# Patient Record
Sex: MALE | Race: WHITE | Employment: FULL TIME | URBAN - METROPOLITAN AREA
[De-identification: names, ages, dates, MRNs, and addresses within clinical notes are randomized per-mention and may not be internally consistent; named-entity substitution may affect disease eponyms.]

---

## 2020-02-03 ENCOUNTER — OFFICE VISIT (OUTPATIENT)
Dept: PRIMARY CARE CLINIC | Age: 40
End: 2020-02-03
Payer: COMMERCIAL

## 2020-02-03 VITALS
RESPIRATION RATE: 14 BRPM | SYSTOLIC BLOOD PRESSURE: 140 MMHG | BODY MASS INDEX: 26.57 KG/M2 | HEIGHT: 72 IN | TEMPERATURE: 97.6 F | HEART RATE: 98 BPM | WEIGHT: 196.2 LBS | DIASTOLIC BLOOD PRESSURE: 78 MMHG

## 2020-02-03 PROCEDURE — 99214 OFFICE O/P EST MOD 30 MIN: CPT | Performed by: NURSE PRACTITIONER

## 2020-02-03 RX ORDER — PREDNISONE 20 MG/1
40 TABLET ORAL DAILY
Qty: 10 TABLET | Refills: 0 | Status: SHIPPED | OUTPATIENT
Start: 2020-02-03 | End: 2020-02-08

## 2020-02-03 RX ORDER — DOXYCYCLINE HYCLATE 100 MG/1
100 CAPSULE ORAL 2 TIMES DAILY
Qty: 20 CAPSULE | Refills: 0 | Status: SHIPPED | OUTPATIENT
Start: 2020-02-03 | End: 2020-02-13

## 2020-02-03 ASSESSMENT — ENCOUNTER SYMPTOMS
ABDOMINAL PAIN: 0
VOMITING: 0
RHINORRHEA: 1
SINUS PRESSURE: 1
TROUBLE SWALLOWING: 0
SINUS PAIN: 1
NAUSEA: 0
SWOLLEN GLANDS: 0
WHEEZING: 0
SHORTNESS OF BREATH: 0
DIARRHEA: 0
SORE THROAT: 1
COUGH: 1

## 2020-02-03 NOTE — PATIENT INSTRUCTIONS
label. These medicines may not be safe for young children or for people with certain health problems. · Be careful when taking over-the-counter cold or flu medicines and Tylenol at the same time. Many of these medicines have acetaminophen, which is Tylenol. Read the labels to make sure that you are not taking more than the recommended dose. Too much acetaminophen (Tylenol) can be harmful. · Get plenty of rest.  · Do not smoke or allow others to smoke around you. If you need help quitting, talk to your doctor about stop-smoking programs and medicines. These can increase your chances of quitting for good. When should you call for help? Call 911 anytime you think you may need emergency care. For example, call if:    · You have severe trouble breathing.    Call your doctor now or seek immediate medical care if:    · You seem to be getting much sicker.     · You have new or worse trouble breathing.     · You have a new or higher fever.     · You have a new rash.    Watch closely for changes in your health, and be sure to contact your doctor if:    · You have a new symptom, such as a sore throat, an earache, or sinus pain.     · You cough more deeply or more often, especially if you notice more mucus or a change in the color of your mucus.     · You do not get better as expected. Where can you learn more? Go to https://Keemotion.[employer]. org and sign in to your [employer] account. Enter G632 in the [facility] box to learn more about \"Upper Respiratory Infection (Cold): Care Instructions. \"     If you do not have an account, please click on the \"Sign Up Now\" link. Current as of: June 9, 2019  Content Version: 12.3  © 5201-9709 Healthwise, [employer]. Care instructions adapted under license by [facility] ([facility]).  If you have questions about a medical condition or this instruction, always ask your healthcare professional. Giovany López any warranty or liability for your use of

## 2020-02-03 NOTE — LETTER
[place] [address] [address] [facility]  Phone: [phone]  Fax: [phone] [facility], APRN - CNP        February 3, 2020     Patient: Smitha Eye   YOB: 1980   Date of Visit: 2/3/2020       To Whom It May Concern: It is my medical opinion that Kate Jason should remain out of work until 02/05/2020. If you have any questions or concerns, please don't hesitate to call.     Sincerely,        Libertad Jean, APRN - CNP

## 2020-02-03 NOTE — PROGRESS NOTES
[facility]  [facility] [place] [address] [address] [facility]  Dept: [phone]  Dept Fax: [phone]    Santi Dunham is a 35 y.o. male who [facility] today for his medical conditions/complaints as noted below. Santi Dunham is c/o of URI (X 2 days with right ear pain. )      HPI:     URI    This is a new problem. The current episode started in the past 7 days. The problem has been gradually worsening. Maximum temperature: SUBJECTIVE. Associated symptoms include congestion, coughing, ear pain, headaches, a plugged ear sensation, rhinorrhea, sinus pain, sneezing and a sore throat. Pertinent negatives include no abdominal pain, chest pain, diarrhea, dysuria, joint pain, joint swelling, nausea, neck pain, rash, swollen glands, vomiting or wheezing. He has tried nothing for the symptoms. The treatment provided no relief. Ear Fullness    There is pain in the right ear. This is a new problem. The current episode started in the past 7 days. The problem occurs constantly. The problem has been gradually worsening. Maximum temperature: SUBJECTIVE. The fever has been present for 1 to 2 days. The pain is at a severity of 6/10. The pain is moderate. Associated symptoms include coughing, headaches, hearing loss (MUFFLED), rhinorrhea and a sore throat. Pertinent negatives include no abdominal pain, diarrhea, ear discharge, neck pain, rash or vomiting. He has tried nothing for the symptoms. The treatment provided no relief. There is no history of a chronic ear infection, hearing loss or a tympanostomy tube. No past medical history on file.      Current Outpatient Medications   Medication Sig Dispense Refill    doxycycline hyclate (VIBRAMYCIN) 100 MG capsule Take 1 capsule by mouth 2 times daily for 10 days 20 capsule 0    predniSONE (DELTASONE) 20 MG tablet Take 2 tablets by mouth daily for 5 days 10 tablet 0     No current facility-administered medications for this visit. Allergies   Allergen Reactions    Penicillins        Subjective:      Review of Systems   Constitutional: Positive for fatigue and fever. Negative for chills. HENT: Positive for congestion, ear pain, hearing loss (MUFFLED), postnasal drip, rhinorrhea, sinus pressure, sinus pain, sneezing and sore throat. Negative for ear discharge and trouble swallowing. Respiratory: Positive for cough. Negative for shortness of breath and wheezing. Cardiovascular: Negative for chest pain. Gastrointestinal: Negative for abdominal pain, diarrhea, nausea and vomiting. Genitourinary: Negative for decreased urine volume, difficulty urinating and dysuria. Musculoskeletal: Negative for joint pain, neck pain and neck stiffness. Skin: Negative for rash. Neurological: Positive for headaches. Negative for dizziness, syncope and light-headedness. Objective:     Physical Exam  Vitals signs and nursing note reviewed. Constitutional:       General: He is not in acute distress. Appearance: He is well-developed. He is ill-appearing. HENT:      Right Ear: Ear canal normal. A middle ear effusion is present. Tympanic membrane is erythematous and bulging. Left Ear: Tympanic membrane and ear canal normal.  No middle ear effusion. Nose: Mucosal edema and rhinorrhea present. Right Sinus: Maxillary sinus tenderness and frontal sinus tenderness present. Left Sinus: Maxillary sinus tenderness and frontal sinus tenderness present. Mouth/Throat:      Mouth: Mucous membranes are not dry. Pharynx: Posterior oropharyngeal erythema present. Eyes:      Conjunctiva/sclera: Conjunctivae normal.   Neck:      Musculoskeletal: Normal range of motion and neck supple. Cardiovascular:      Rate and Rhythm: Normal rate and regular rhythm. Heart sounds: Normal heart sounds. Pulmonary:      Effort: Pulmonary effort is normal.      Breath sounds: Normal breath sounds. No wheezing. Lymphadenopathy:      Cervical: No cervical adenopathy. Skin:     General: Skin is warm and dry. Findings: No rash. Neurological:      Mental Status: He is alert. BP (!) 140/78   Pulse 98   Temp 97.6 °F (36.4 °C) (Temporal)   Resp 14   Ht 6' (1.829 m)   Wt 196 lb 3.2 oz (89 kg)   BMI 26.61 kg/m²     Assessment:      Diagnosis Orders   1. Acute non-recurrent pansinusitis  doxycycline hyclate (VIBRAMYCIN) 100 MG capsule    predniSONE (DELTASONE) 20 MG tablet   2. Right otitis media with effusion  doxycycline hyclate (VIBRAMYCIN) 100 MG capsule    predniSONE (DELTASONE) 20 MG tablet       Plan:             Discussed exam, POCT findings, plan of care (including prescriptive and supportive as listed below) and follow-up atlength with  Patient. Reviewed all prescribed and recommended medications, administration and side effects. Encouraged to return to [address] [place] [address] for noimprovement and or worsening of symptoms. To ER or call 911 if any difficulty breathing, shortness of breath, inability to swallow, hives or temp greater than 103 degrees. Questions answered. They verbalized understandingand agreement. Return if symptoms worsen or fail to improve. Orders Placed This Encounter   Medications    doxycycline hyclate (VIBRAMYCIN) 100 MG capsule     Sig: Take 1 capsule by mouth 2 times daily for 10 days     Dispense:  20 capsule     Refill:  0    predniSONE (DELTASONE) 20 MG tablet     Sig: Take 2 tablets by mouth daily for 5 days     Dispense:  10 tablet     Refill:  0          All patient questions answered. Pt voiced understanding.       Electronically signed by [address] [place] [address], APRN - CNP on 2/3/2020 at 9:30 AM

## 2020-02-20 ENCOUNTER — OFFICE VISIT (OUTPATIENT)
Dept: PRIMARY CARE CLINIC | Age: 40
End: 2020-02-20
Payer: COMMERCIAL

## 2020-02-20 VITALS
DIASTOLIC BLOOD PRESSURE: 86 MMHG | WEIGHT: 196.2 LBS | SYSTOLIC BLOOD PRESSURE: 124 MMHG | BODY MASS INDEX: 26.57 KG/M2 | HEIGHT: 72 IN | TEMPERATURE: 99.3 F | RESPIRATION RATE: 16 BRPM | HEART RATE: 82 BPM

## 2020-02-20 PROCEDURE — 99213 OFFICE O/P EST LOW 20 MIN: CPT | Performed by: NURSE PRACTITIONER

## 2020-02-20 RX ORDER — LORATADINE AND PSEUDOEPHEDRINE 10; 240 MG/1; MG/1
1 TABLET, EXTENDED RELEASE ORAL DAILY
Qty: 14 TABLET | Refills: 0 | Status: SHIPPED | OUTPATIENT
Start: 2020-02-20 | End: 2022-03-17

## 2020-02-20 ASSESSMENT — ENCOUNTER SYMPTOMS
RHINORRHEA: 0
WHEEZING: 0
PHOTOPHOBIA: 0
EYE REDNESS: 0
SHORTNESS OF BREATH: 0
SORE THROAT: 0
COUGH: 0
ABDOMINAL PAIN: 0
FACIAL SWELLING: 0
VOMITING: 0
NAUSEA: 0
SINUS PAIN: 0

## 2020-02-20 NOTE — PATIENT INSTRUCTIONS
wax softener, use an ear syringe to gently flush the ear. Make sure the flushing solution is body temperature. Cool or hot fluids in the ear can cause dizziness. When should you call for help? Call your doctor now or seek immediate medical care if:    · Pus or blood drains from your ear.     · Your ears are ringing or feel full.     · You have a loss of hearing.    Watch closely for changes in your health, and be sure to contact your doctor if:    · You have pain or reduced hearing after 1 week of home treatment.     · You have any new symptoms, such as nausea or balance problems. Where can you learn more? Go to https://Voltari.GetGifted. org and sign in to your [employer] account. Enter N371 in the Ask.com box to learn more about \"Earwax Blockage: Care Instructions. \"     If you do not have an account, please click on the \"Sign Up Now\" link. Current as of: June 26, 2019  Content Version: 12.3  © 9431-2142 Healthwise, Incorporated. Care instructions adapted under license by [facility] ([facility]). If you have questions about a medical condition or this instruction, always ask your healthcare professional. [place] [address] any warranty or liability for your use of this information.

## 2020-02-20 NOTE — PROGRESS NOTES
Musculoskeletal: Negative for myalgias. Skin: Negative for rash. Objective:     Physical Exam  Vitals signs and nursing note reviewed. Constitutional:       General: He is not in acute distress. Appearance: Normal appearance. He is not ill-appearing, toxic-appearing or diaphoretic. HENT:      Right Ear: A middle ear effusion is present. There is no impacted cerumen. Tympanic membrane is not erythematous or bulging. Left Ear:  No middle ear effusion. There is no impacted cerumen. Tympanic membrane is not erythematous or bulging. Nose: Mucosal edema and congestion present. No rhinorrhea. Right Sinus: No maxillary sinus tenderness or frontal sinus tenderness. Left Sinus: No maxillary sinus tenderness or frontal sinus tenderness. Mouth/Throat:      Mouth: Mucous membranes are moist.      Pharynx: No oropharyngeal exudate or posterior oropharyngeal erythema. Eyes:      General:         Right eye: No discharge. Left eye: No discharge. Conjunctiva/sclera: Conjunctivae normal.   Neck:      Musculoskeletal: Normal range of motion and neck supple. Cardiovascular:      Rate and Rhythm: Normal rate and regular rhythm. Heart sounds: No murmur. Pulmonary:      Effort: Pulmonary effort is normal. No respiratory distress. Breath sounds: Normal breath sounds. No wheezing, rhonchi or rales. Neurological:      Mental Status: He is alert. /86 (Site: Right Upper Arm, Position: Sitting, Cuff Size: Large Adult)   Pulse 82   Temp 99.3 °F (37.4 °C) (Temporal)   Resp 16   Ht 6' (1.829 m)   Wt 196 lb 3.2 oz (89 kg)   BMI 26.61 kg/m²     Assessment:      Diagnosis Orders   1. Middle ear effusion, right  loratadine-pseudoephedrine (CLARITIN-D 24 HOUR)  MG per extended release tablet     Patient presents with right ear fullness following treatment of a acute sinusitis. On physical exam he does have a middle effusion behind his right ear.   We will

## 2021-07-09 ENCOUNTER — TELEPHONE (OUTPATIENT)
Dept: PRIMARY CARE CLINIC | Age: 41
End: 2021-07-09

## 2021-08-09 ENCOUNTER — OFFICE VISIT (OUTPATIENT)
Dept: PRIMARY CARE CLINIC | Age: 41
End: 2021-08-09
Payer: COMMERCIAL

## 2021-08-09 VITALS
OXYGEN SATURATION: 99 % | RESPIRATION RATE: 20 BRPM | DIASTOLIC BLOOD PRESSURE: 64 MMHG | HEART RATE: 88 BPM | TEMPERATURE: 97.8 F | SYSTOLIC BLOOD PRESSURE: 118 MMHG | BODY MASS INDEX: 26.06 KG/M2 | HEIGHT: 72 IN | WEIGHT: 192.4 LBS

## 2021-08-09 DIAGNOSIS — Z00.00 ENCOUNTER FOR MEDICAL EXAMINATION TO ESTABLISH CARE: ICD-10-CM

## 2021-08-09 DIAGNOSIS — Z13.220 LIPID SCREENING: ICD-10-CM

## 2021-08-09 DIAGNOSIS — F43.9 STRESS: Primary | ICD-10-CM

## 2021-08-09 DIAGNOSIS — Z13.1 DIABETES MELLITUS SCREENING: ICD-10-CM

## 2021-08-09 PROCEDURE — 99203 OFFICE O/P NEW LOW 30 MIN: CPT | Performed by: NURSE PRACTITIONER

## 2021-08-09 RX ORDER — ESCITALOPRAM OXALATE 10 MG/1
10 TABLET ORAL DAILY
Qty: 90 TABLET | Refills: 1 | Status: SHIPPED | OUTPATIENT
Start: 2021-08-09 | End: 2022-01-31 | Stop reason: SDUPTHER

## 2021-08-09 SDOH — ECONOMIC STABILITY: FOOD INSECURITY: WITHIN THE PAST 12 MONTHS, YOU WORRIED THAT YOUR FOOD WOULD RUN OUT BEFORE YOU GOT MONEY TO BUY MORE.: PATIENT DECLINED

## 2021-08-09 SDOH — ECONOMIC STABILITY: FOOD INSECURITY: WITHIN THE PAST 12 MONTHS, THE FOOD YOU BOUGHT JUST DIDN'T LAST AND YOU DIDN'T HAVE MONEY TO GET MORE.: PATIENT DECLINED

## 2021-08-09 ASSESSMENT — PATIENT HEALTH QUESTIONNAIRE - PHQ9
SUM OF ALL RESPONSES TO PHQ QUESTIONS 1-9: 1
SUM OF ALL RESPONSES TO PHQ QUESTIONS 1-9: 1
SUM OF ALL RESPONSES TO PHQ9 QUESTIONS 1 & 2: 1
SUM OF ALL RESPONSES TO PHQ QUESTIONS 1-9: 1
2. FEELING DOWN, DEPRESSED OR HOPELESS: 1
1. LITTLE INTEREST OR PLEASURE IN DOING THINGS: 0

## 2021-08-09 ASSESSMENT — ENCOUNTER SYMPTOMS
COUGH: 0
WHEEZING: 0
DIARRHEA: 0
RHINORRHEA: 0
CONSTIPATION: 0
NAUSEA: 0
VISUAL CHANGE: 0
VOMITING: 0
SORE THROAT: 0
SHORTNESS OF BREATH: 0
ABDOMINAL PAIN: 0

## 2021-08-09 ASSESSMENT — SOCIAL DETERMINANTS OF HEALTH (SDOH): HOW HARD IS IT FOR YOU TO PAY FOR THE VERY BASICS LIKE FOOD, HOUSING, MEDICAL CARE, AND HEATING?: PATIENT DECLINED

## 2021-08-09 NOTE — PATIENT INSTRUCTIONS
SURVEY:    You may be receiving a survey from [employer] regarding your visit today. Please complete the survey to enable us to provide the highest quality of care to you and your family. If you cannot score us a very good on any question, please call the office to discuss how we could have made your experience a very good one. Thank you.   Rosa Jean, APRN-CNP  Chris Singh, APRN-CNP  Lorelei Karma, LPN [place] [facility], LPN [place], [place] Emeterio Hilda, [place] Pam, PCA

## 2021-08-09 NOTE — PROGRESS NOTES
Name: Divya Garcia  : 1980         Chief Complaint:     Chief Complaint   Patient presents with   [address]     Previous PCP Dr. Felton Sage, has not been seen for 5 years. Concerned with sleep issues. History of Present Illness:      Divya Garcia is a 38 y.o.  male who presents with Establish Care (Previous PCP Dr. Felton Sage, has not been seen for 5 years. Concerned with sleep issues.)      Cathy Antony is here today to establish care. Overall he is feeling relatively well and has no medical issues or concerns at this time. He does complain of stress and inability to sleep. Patient states he wakes at least 4-5 times per night and not to use the restroom. He states his job is stressful and he does worry about his job while trying to sleep. He has taken melatonin and Tylenol in the past with limited success. See below for further comment. Mental Health Problem  The primary symptoms include dysphoric mood. The primary symptoms do not include delusions, hallucinations, bizarre behavior, disorganized speech, negative symptoms or somatic symptoms. Primary symptoms comment: NERVOUSNESS. The current episode started more than 1 month ago. This is a chronic problem. The onset of the illness is precipitated by emotional stress. The degree of incapacity that he is experiencing as a consequence of his illness is moderate. Sequelae of the illness include harmed interpersonal relations and an inability to work. Additional symptoms of the illness include insomnia, agitation, attention impairment and distractible. Additional symptoms of the illness do not include anhedonia, hypersomnia, appetite change, unexpected weight change, fatigue, psychomotor retardation, feelings of worthlessness, euphoric mood, increased goal-directed activity, flight of ideas, inflated self-esteem, decreased need for sleep, poor judgment, visual change, headaches, abdominal pain or seizures. He does not admit to suicidal ideas.  He does not have a plan to attempt suicide. He does not contemplate harming himself. He has not already injured self. He does not contemplate injuring another person. He has not already  injured another person. Risk factors that are present for mental illness include a family history of mental illness. Past Medical History:     History reviewed. No pertinent past medical history. Reviewed all health maintenance requirements and ordered appropriate tests  Health Maintenance Due   Topic Date Due    Lipid screen  Never done    Diabetes screen  Never done       Past Surgical History:     History reviewed. No pertinent surgical history. Medications:       Prior to Admission medications    Medication Sig Start Date End Date Taking? Authorizing Provider   escitalopram (LEXAPRO) 10 MG tablet Take 1 tablet by mouth daily 8/9/21  Yes MARQUITA Parker CNP   loratadine-pseudoephedrine (CLARITIN-D 24 HOUR)  MG per extended release tablet Take 1 tablet by mouth daily  Patient not taking: Reported on 8/9/2021 2/20/20   MARQUITA Valle CNP        Allergies:       Penicillins    Social History:     Tobacco:    reports that he has quit smoking. He has never used smokeless tobacco.  Alcohol:      reports current alcohol use. Drug Use:  reports no history of drug use. Family History:     History reviewed. No pertinent family history. Review of Systems:     Positive and Negative as described in HPI    Review of Systems   Constitutional: Negative for appetite change, chills, fatigue, fever and unexpected weight change. HENT: Negative for congestion, rhinorrhea and sore throat. Eyes: Negative for visual disturbance. Respiratory: Negative for cough, shortness of breath and wheezing. Cardiovascular: Negative for chest pain and palpitations. Gastrointestinal: Negative for abdominal pain, constipation, diarrhea, nausea and vomiting. Genitourinary: Negative for difficulty urinating and dysuria. Thought content normal. Thought content does not include homicidal or suicidal ideation. Thought content does not include homicidal or suicidal plan. Cognition and Memory: Cognition normal.         Judgment: Judgment normal.         Data:     No results found for: NA, K, CL, CO2, BUN, CREATININE, GLUCOSE, PROT, LABALBU, BILITOT, ALKPHOS, AST, ALT  No results found for: WBC, RBC, HGB, HCT, MCV, MCH, MCHC, RDW, PLT, MPV  No results found for: TSH  No results found for: CHOL, HDL, PSA, LABA1C    Assessment/Plan:      Diagnosis Orders   1. Stress  escitalopram (LEXAPRO) 10 MG tablet   2. Lipid screening  Lipid Panel   3. Diabetes mellitus screening  Glucose, Fasting   4. Encounter for medical examination to establish care       We will start esitalopram 10 mg daily. I have made him aware that it may take 1 to 2 weeks for him to notice any difference if at all. He is agreeable. We will see him back in 3 to 4 weeks, sooner if any problems. Labs as ordered. 1.  Nikhil received counseling on the following healthy behaviors: nutrition, exercise and medication adherence  2. Patient given educational materials - see patient instructions  3. Was a self-tracking handout given in paper form or via [employer]? No  If yes, see orders or list here. 4.  Discussed use, benefit, and side effects of prescribed medications. Barriers to medication compliance addressed. All patient questions answered. Pt voiced understanding. 5.  Reviewed prior labs and health maintenance  6. Continue current medications, diet and exercise. Completed Refills   Requested Prescriptions     Signed Prescriptions Disp Refills    escitalopram (LEXAPRO) 10 MG tablet 90 tablet 1     Sig: Take 1 tablet by mouth daily         Return in about 4 weeks (around 9/6/2021) for Recheck.

## 2021-08-13 LAB
CHOLESTEROL/HDL RATIO: 3.6 (ref 1–5)
CHOLESTEROL: 202 MG/DL (ref 150–200)
GLUCOSE: 111 MG/DL (ref 65–99)
HDLC SERPL-MCNC: 56 MG/DL
LDL CHOLESTEROL CALCULATED: 114 MG/DL
LDL/HDL RATIO: 2
TRIGL SERPL-MCNC: 158 MG/DL (ref 27–150)
VLDLC SERPL CALC-MCNC: 32 MG/DL (ref 0–30)

## 2021-08-16 ENCOUNTER — TELEPHONE (OUTPATIENT)
Dept: PRIMARY CARE CLINIC | Age: 41
End: 2021-08-16

## 2021-08-16 NOTE — TELEPHONE ENCOUNTER
Contacted patient in regards to lab results. Patient scheduled 8/17 @ 8:30 for A1c check. Patient verbalized understanding.

## 2021-08-16 NOTE — TELEPHONE ENCOUNTER
----- Message from [facility], APRN - CNP sent at 8/14/2021  9:46 AM EDT -----  Lipids and glucose a little elevated. Have him come in for A1c. Thank you.

## 2021-08-17 ENCOUNTER — NURSE ONLY (OUTPATIENT)
Dept: PRIMARY CARE CLINIC | Age: 41
End: 2021-08-17
Payer: COMMERCIAL

## 2021-08-17 VITALS
RESPIRATION RATE: 20 BRPM | DIASTOLIC BLOOD PRESSURE: 70 MMHG | BODY MASS INDEX: 26.31 KG/M2 | SYSTOLIC BLOOD PRESSURE: 134 MMHG | WEIGHT: 194 LBS | HEART RATE: 74 BPM | OXYGEN SATURATION: 98 % | TEMPERATURE: 97.5 F

## 2021-08-17 DIAGNOSIS — R73.9 ELEVATED BLOOD SUGAR: Primary | ICD-10-CM

## 2021-08-17 LAB — HBA1C MFR BLD: 5.8 %

## 2021-08-17 PROCEDURE — 83036 HEMOGLOBIN GLYCOSYLATED A1C: CPT | Performed by: NURSE PRACTITIONER

## 2021-09-15 ENCOUNTER — OFFICE VISIT (OUTPATIENT)
Dept: PRIMARY CARE CLINIC | Age: 41
End: 2021-09-15
Payer: COMMERCIAL

## 2021-09-15 VITALS
SYSTOLIC BLOOD PRESSURE: 122 MMHG | DIASTOLIC BLOOD PRESSURE: 82 MMHG | WEIGHT: 187.3 LBS | OXYGEN SATURATION: 98 % | TEMPERATURE: 97.8 F | RESPIRATION RATE: 20 BRPM | BODY MASS INDEX: 25.4 KG/M2 | HEART RATE: 72 BPM

## 2021-09-15 DIAGNOSIS — R73.9 ELEVATED BLOOD SUGAR: ICD-10-CM

## 2021-09-15 DIAGNOSIS — F43.9 STRESS: Primary | ICD-10-CM

## 2021-09-15 PROCEDURE — 99214 OFFICE O/P EST MOD 30 MIN: CPT | Performed by: NURSE PRACTITIONER

## 2021-09-15 ASSESSMENT — ENCOUNTER SYMPTOMS
WHEEZING: 0
VISUAL CHANGE: 0
CONSTIPATION: 0
SORE THROAT: 0
RHINORRHEA: 0
ABDOMINAL PAIN: 0
COUGH: 0
NAUSEA: 0
VOMITING: 0
SHORTNESS OF BREATH: 0
DIARRHEA: 0

## 2021-09-15 NOTE — PROGRESS NOTES
Name: Thong Douglas  : 1980         Chief Complaint:     Chief Complaint   Patient presents with    Stress     feeling much better       History of Present Illness:      Thong Douglas is a 38 y.o.  male who presents with Stress (feeling much better)      HPI      Past Medical History:     History reviewed. No pertinent past medical history. Reviewed all health maintenance requirements and ordered appropriate tests  There are no preventive care reminders to display for this patient. Past Surgical History:     History reviewed. No pertinent surgical history. Medications:       Prior to Admission medications    Medication Sig Start Date End Date Taking? Authorizing Provider   escitalopram (LEXAPRO) 10 MG tablet Take 1 tablet by mouth daily 21  Yes MARQUITA Obando CNP   loratadine-pseudoephedrine (CLARITIN-D 24 HOUR)  MG per extended release tablet Take 1 tablet by mouth daily  Patient not taking: Reported on 2021   MARQUITA Quiles CNP        Allergies:       Penicillins    Social History:     Tobacco:    reports that he has quit smoking. He has never used smokeless tobacco.  Alcohol:      reports current alcohol use. Drug Use:  reports no history of drug use. Family History:     History reviewed. No pertinent family history. Review of Systems:     Positive and Negative as described in HPI    Review of Systems    Physical Exam:   Vitals:  /82 (Position: Sitting)   Pulse 72   Temp 97.8 °F (36.6 °C) (Temporal)   Resp 20   Wt 187 lb 4.8 oz (85 kg)   SpO2 98%   BMI 25.40 kg/m²     Physical Exam    Data:     Lab Results   Component Value Date    GLUCOSE 111 2021     No results found for: WBC, RBC, HGB, HCT, MCV, MCH, MCHC, RDW, PLT, MPV  No results found for: TSH  Lab Results   Component Value Date    CHOL 202 2021    HDL 56 2021    LABA1C 5.8 2021       Assessment/Plan:      Diagnosis Orders   1. Stress         1.   Gennaro Newell received counseling on the following healthy behaviors: {[facility]:20719}  2. Patient given educational materials - see patient instructions  3. Was a self-tracking handout given in paper form or via [employer]? {YES / [facility]:16164}  If yes, see orders or list here. 4.  Discussed use, benefit, and side effects of prescribed medications. Barriers to medication compliance addressed. All patient questions answered. Pt voiced understanding. 5.  Reviewed prior labs and health maintenance  6. Continue current medications, diet and exercise. Completed Refills   Requested Prescriptions      No prescriptions requested or ordered in this encounter         Return in about 6 months (around 3/15/2022) for Check up.

## 2021-09-15 NOTE — PATIENT INSTRUCTIONS
SURVEY:    You may be receiving a survey from [employer] regarding your visit today. Please complete the survey to enable us to provide the highest quality of care to you and your family. If you cannot score us a very good on any question, please call the office to discuss how we could have made your experience a very good one. Thank you.   Sabas Jean, APRN-TANA Malhotra, TANA Cardenas, MIKE Naik, MIKE Ar[place], [place]  Katt, PCA

## 2021-09-15 NOTE — PROGRESS NOTES
history of mental illness. Past Medical History:     History reviewed. No pertinent past medical history. Reviewed all health maintenance requirements and ordered appropriate tests  There are no preventive care reminders to display for this patient. Past Surgical History:     History reviewed. No pertinent surgical history. Medications:       Prior to Admission medications    Medication Sig Start Date End Date Taking? Authorizing Provider   escitalopram (LEXAPRO) 10 MG tablet Take 1 tablet by mouth daily 8/9/21  Yes MARQUITA Olivera CNP   loratadine-pseudoephedrine (CLARITIN-D 24 HOUR)  MG per extended release tablet Take 1 tablet by mouth daily  Patient not taking: Reported on 8/9/2021 2/20/20   [place] MARQUITA Mckay CNP        Allergies:       Penicillins    Social History:     Tobacco:    reports that he has quit smoking. He has never used smokeless tobacco.  Alcohol:      reports current alcohol use. Drug Use:  reports no history of drug use. Family History:     History reviewed. No pertinent family history. Review of Systems:     Positive and Negative as described in HPI    Review of Systems   Constitutional: Negative for appetite change, chills, fatigue, fever and unexpected weight change. HENT: Negative for congestion, rhinorrhea and sore throat. Eyes: Negative for visual disturbance. Respiratory: Negative for cough, shortness of breath and wheezing. Cardiovascular: Negative for chest pain and palpitations. Gastrointestinal: Negative for abdominal pain, constipation, diarrhea, nausea and vomiting. Genitourinary: Negative for difficulty urinating and dysuria. Musculoskeletal: Negative for gait problem, neck pain and neck stiffness. Skin: Negative for rash. Neurological: Negative for dizziness, seizures, syncope, light-headedness and headaches. Psychiatric/Behavioral: Positive for agitation (improved) and sleep disturbance (improved).  Negative for behavioral problems, confusion, decreased concentration, dysphoric mood, hallucinations, self-injury and suicidal ideas. The patient is nervous/anxious (improved) and has insomnia. The patient is not hyperactive. Physical Exam:   Vitals:  /82 (Position: Sitting)   Pulse 72   Temp 97.8 °F (36.6 °C) (Temporal)   Resp 20   Wt 187 lb 4.8 oz (85 kg)   SpO2 98%   BMI 25.40 kg/m²     Physical Exam  Vitals and nursing note reviewed. Constitutional:       General: He is not in acute distress. Appearance: Normal appearance. He is not ill-appearing. HENT:      Mouth/Throat:      Mouth: Mucous membranes are moist.   Eyes:      General: No scleral icterus. Conjunctiva/sclera: Conjunctivae normal.   Neck:      Vascular: No carotid bruit. Cardiovascular:      Rate and Rhythm: Normal rate and regular rhythm. Heart sounds: No murmur heard. Pulmonary:      Effort: Pulmonary effort is normal.      Breath sounds: Normal breath sounds. No wheezing. Abdominal:      General: Bowel sounds are normal. There is no distension. Palpations: Abdomen is soft. Tenderness: There is no abdominal tenderness. Musculoskeletal:         General: Normal range of motion. Cervical back: Normal range of motion and neck supple. Skin:     General: Skin is warm and dry. Findings: No rash. Neurological:      Mental Status: He is alert and oriented to person, place, and time. Psychiatric:         Attention and Perception: Attention normal.         Mood and Affect: Mood and affect normal. Mood is not anxious or depressed. Speech: Speech normal.         Behavior: Behavior normal. Behavior is cooperative. Thought Content: Thought content normal. Thought content does not include homicidal or suicidal ideation. Thought content does not include homicidal or suicidal plan.          Cognition and Memory: Cognition normal.         Judgment: Judgment normal.         Data:     Lab Results Component Value Date    GLUCOSE 111 08/13/2021     No results found for: WBC, RBC, HGB, HCT, MCV, MCH, MCHC, RDW, PLT, MPV  No results found for: TSH  Lab Results   Component Value Date    CHOL 202 08/13/2021    HDL 56 08/13/2021    LABA1C 5.8 08/17/2021       Assessment/Plan:      Diagnosis Orders   1. Stress     2. Elevated blood sugar       Continue Lexapro at 10 mg daily. If he feels the need to increase to 20 mg may do that and call with progress. Continue healthy lifestyle. 1.  Nikhil received counseling on the following healthy behaviors: nutrition, exercise and medication adherence  2. Patient given educational materials - see patient instructions  3. Was a self-tracking handout given in paper form or via [employer]? No  If yes, see orders or list here. 4.  Discussed use, benefit, and side effects of prescribed medications. Barriers to medication compliance addressed. All patient questions answered. Pt voiced understanding. 5.  Reviewed prior labs and health maintenance  6. Continue current medications, diet and exercise. Completed Refills   Requested Prescriptions      No prescriptions requested or ordered in this encounter         Return in about 6 months (around 3/15/2022) for Check up.

## 2022-01-31 DIAGNOSIS — F43.9 STRESS: ICD-10-CM

## 2022-01-31 RX ORDER — ESCITALOPRAM OXALATE 10 MG/1
10 TABLET ORAL DAILY
Qty: 90 TABLET | Refills: 1 | Status: SHIPPED | OUTPATIENT
Start: 2022-01-31 | End: 2022-07-01 | Stop reason: SDUPTHER

## 2022-01-31 NOTE — TELEPHONE ENCOUNTER
Health Maintenance   Topic Date Due    DTaP/Tdap/Td vaccine (1 - Tdap) 08/09/2022 (Originally 11/1/1998)    COVID-19 Vaccine (2 - Booster for Patsy series) 08/09/2022 (Originally 6/22/2021)    Hepatitis C screen  08/09/2022 (Originally 1980)    HIV screen  08/09/2022 (Originally 11/1/1994)    Flu vaccine (1) 09/15/2022 (Originally 9/1/2021)    Depression Screen  08/09/2022    A1C test (Diabetic or Prediabetic)  08/17/2022    Lipid screen  08/13/2026    Hepatitis A vaccine  Aged Out    Hepatitis B vaccine  Aged Out    Hib vaccine  Aged Out    Meningococcal (ACWY) vaccine  Aged Out    Pneumococcal 0-64 years Vaccine  Aged Out             (applicable per patient's age: Cancer Screenings, Depression Screening, Fall Risk Screening, Immunizations)    Hemoglobin A1C (%)   Date Value   08/17/2021 5.8     LDL Calculated (mg/dL)   Date Value   08/13/2021 114      (goal A1C is < 7)   (goal LDL is <100) need 30-50% reduction from baseline     BP Readings from Last 3 Encounters:   09/15/21 122/82   08/17/21 134/70   08/09/21 118/64    (goal /80)      All Future Testing planned in [facility]:      Next Visit Date:  Future Appointments   Date Time Provider Norbert Pitt   3/17/2022  9:00 AM Farhat Jean, APRN - CNP [facility]            There is no problem list on file for this patient.

## 2022-03-17 ENCOUNTER — OFFICE VISIT (OUTPATIENT)
Dept: PRIMARY CARE CLINIC | Age: 42
End: 2022-03-17
Payer: COMMERCIAL

## 2022-03-17 VITALS
HEART RATE: 74 BPM | BODY MASS INDEX: 24.42 KG/M2 | HEIGHT: 72 IN | SYSTOLIC BLOOD PRESSURE: 118 MMHG | OXYGEN SATURATION: 100 % | WEIGHT: 180.3 LBS | DIASTOLIC BLOOD PRESSURE: 68 MMHG | TEMPERATURE: 97.8 F | RESPIRATION RATE: 20 BRPM

## 2022-03-17 DIAGNOSIS — R73.9 ELEVATED BLOOD SUGAR: ICD-10-CM

## 2022-03-17 DIAGNOSIS — F43.9 STRESS: Primary | ICD-10-CM

## 2022-03-17 PROCEDURE — 99214 OFFICE O/P EST MOD 30 MIN: CPT | Performed by: NURSE PRACTITIONER

## 2022-03-17 ASSESSMENT — ENCOUNTER SYMPTOMS
DIARRHEA: 0
VOMITING: 0
SORE THROAT: 0
NAUSEA: 0
RHINORRHEA: 0
SHORTNESS OF BREATH: 0
ABDOMINAL PAIN: 0
CONSTIPATION: 0
COUGH: 0
VISUAL CHANGE: 0
WHEEZING: 0

## 2022-03-17 ASSESSMENT — PATIENT HEALTH QUESTIONNAIRE - PHQ9
SUM OF ALL RESPONSES TO PHQ QUESTIONS 1-9: 0
2. FEELING DOWN, DEPRESSED OR HOPELESS: 0
SUM OF ALL RESPONSES TO PHQ9 QUESTIONS 1 & 2: 0
SUM OF ALL RESPONSES TO PHQ QUESTIONS 1-9: 0
1. LITTLE INTEREST OR PLEASURE IN DOING THINGS: 0

## 2022-03-17 NOTE — PROGRESS NOTES
Name: Cindy Kramer  : 1980         Chief Complaint:     Chief Complaint   Patient presents with   [address] Problem     routine check, no concerns       History of Present Illness:      Cindy Kramer is a 42 y.o.  male who presents with Mental Health Problem (routine check, no concerns)      Jade Link is here today for a routine office visit. Stress-stable, patient states he feels the citalopram is working very well. Continues at 10 mg dose and states that on some days he feels that he may need a little more but most days he feels pretty good. See below for further comment. Elevated blood sugar-patient has lost 7 pounds. He stopped drinking soda and most sugary drinks. He also decreased his carbohydrates  UPDATE 2022-continues with weight loss. Walking at least 10,000 steps per day. Feeling very well. Mental Health Problem  The primary symptoms do not include dysphoric mood, delusions, hallucinations, bizarre behavior, disorganized speech, negative symptoms or somatic symptoms. Primary symptoms comment: NERVOUSNESS. The current episode started more than 1 month ago. This is a chronic problem. The onset of the illness is precipitated by emotional stress. The degree of incapacity that he is experiencing as a consequence of his illness is moderate. Sequelae of the illness include harmed interpersonal relations and an inability to work. Additional symptoms of the illness include agitation (rare). Additional symptoms of the illness do not include anhedonia, insomnia, hypersomnia, appetite change, unexpected weight change, fatigue, psychomotor retardation, feelings of worthlessness, attention impairment, euphoric mood, increased goal-directed activity, flight of ideas, inflated self-esteem, decreased need for sleep, distractible, poor judgment, visual change, headaches, abdominal pain or seizures. Associated symptoms comments: SYMPTOMS IMPROVED WITH USE OF LEXAPRO      .  He does not admit to suicidal ideas. He does not have a plan to attempt suicide. He does not contemplate harming himself. He has not already injured self. He does not contemplate injuring another person. He has not already  injured another person. Risk factors that are present for mental illness include a family history of mental illness. Past Medical History:     History reviewed. No pertinent past medical history. Reviewed all health maintenance requirements and ordered appropriate tests  There are no preventive care reminders to display for this patient. Past Surgical History:     History reviewed. No pertinent surgical history. Medications:       Prior to Admission medications    Medication Sig Start Date End Date Taking? Authorizing Provider   escitalopram (LEXAPRO) 10 MG tablet Take 1 tablet by mouth daily 1/31/22  Yes Marisol Parents Might, APRN - CNP        Allergies:       Penicillins    Social History:     Tobacco:    reports that he has quit smoking. He has never used smokeless tobacco.  Alcohol:      reports current alcohol use. Drug Use:  reports no history of drug use. Family History:     History reviewed. No pertinent family history. Review of Systems:     Positive and Negative as described in HPI    Review of Systems   Constitutional: Negative for appetite change, chills, fatigue, fever and unexpected weight change. HENT: Negative for congestion, rhinorrhea and sore throat. Eyes: Negative for visual disturbance. Respiratory: Negative for cough, shortness of breath and wheezing. Cardiovascular: Negative for chest pain and palpitations. Gastrointestinal: Negative for abdominal pain, constipation, diarrhea, nausea and vomiting. Genitourinary: Negative for difficulty urinating and dysuria. Musculoskeletal: Negative for gait problem, neck pain and neck stiffness. Skin: Negative for rash. Neurological: Negative for dizziness, seizures, syncope, light-headedness and headaches. Psychiatric/Behavioral: Positive for agitation (rare). Negative for behavioral problems, confusion, decreased concentration, dysphoric mood, hallucinations, self-injury, sleep disturbance and suicidal ideas. The patient is not nervous/anxious, does not have insomnia and is not hyperactive. Physical Exam:   Vitals:  /68 (Position: Sitting)   Pulse 74   Temp 97.8 °F (36.6 °C) (Temporal)   Resp 20   Ht 6' (1.829 m)   Wt 180 lb 4.8 oz (81.8 kg)   SpO2 100%   BMI 24.45 kg/m²     Physical Exam  Vitals and nursing note reviewed. Constitutional:       General: He is not in acute distress. Appearance: Normal appearance. He is not ill-appearing. HENT:      Mouth/Throat:      Mouth: Mucous membranes are moist.   Eyes:      General: No scleral icterus. Conjunctiva/sclera: Conjunctivae normal.   Neck:      Vascular: No carotid bruit. Cardiovascular:      Rate and Rhythm: Normal rate and regular rhythm. Heart sounds: No murmur heard. Pulmonary:      Effort: Pulmonary effort is normal.      Breath sounds: Normal breath sounds. No wheezing. Abdominal:      General: Bowel sounds are normal. There is no distension. Palpations: Abdomen is soft. Tenderness: There is no abdominal tenderness. Musculoskeletal:         General: Normal range of motion. Cervical back: Normal range of motion and neck supple. Skin:     General: Skin is warm and dry. Findings: No rash. Neurological:      Mental Status: He is alert and oriented to person, place, and time. Psychiatric:         Attention and Perception: Attention normal.         Mood and Affect: Mood and affect normal. Mood is not anxious or depressed. Speech: Speech normal.         Behavior: Behavior normal. Behavior is cooperative. Thought Content: Thought content normal. Thought content does not include homicidal or suicidal ideation. Thought content does not include homicidal or suicidal plan. Cognition and Memory: Cognition normal.         Judgment: Judgment normal.         Data:     Lab Results   Component Value Date    GLUCOSE 111 08/13/2021     No results found for: WBC, RBC, HGB, HCT, MCV, MCH, MCHC, RDW, PLT, MPV  No results found for: TSH  Lab Results   Component Value Date    CHOL 202 08/13/2021    HDL 56 08/13/2021    LABA1C 5.8 08/17/2021       Assessment/Plan:      Diagnosis Orders   1. Stress  CBC with Auto Differential    ALT    AST    Basic Metabolic Panel    Lipid Panel   2. Elevated blood sugar  CBC with Auto Differential    ALT    AST    Basic Metabolic Panel    Lipid Panel     We will continue current medication at current dose. He may increase Lexapro to 15 mg daily if he feels the need. He will contact the office if he does this. We will see him back in 6 months with labs, sooner if any issues. 1.  Nikhil received counseling on the following healthy behaviors: nutrition, exercise and medication adherence  2. Patient given educational materials - see patient instructions  3. Was a self-tracking handout given in paper form or via [employer]? No  If yes, see orders or list here. 4.  Discussed use, benefit, and side effects of prescribed medications. Barriers to medication compliance addressed. All patient questions answered. Pt voiced understanding. 5.  Reviewed prior labs and health maintenance  6. Continue current medications, diet and exercise. Completed Refills   Requested Prescriptions      No prescriptions requested or ordered in this encounter         Return in about 6 months (around 9/17/2022) for Check up.

## 2022-07-01 ENCOUNTER — TELEPHONE (OUTPATIENT)
Dept: PRIMARY CARE CLINIC | Age: 42
End: 2022-07-01

## 2022-07-01 DIAGNOSIS — F43.9 STRESS: ICD-10-CM

## 2022-07-01 RX ORDER — ESCITALOPRAM OXALATE 10 MG/1
15 TABLET ORAL DAILY
Qty: 135 TABLET | Refills: 1 | Status: SHIPPED | OUTPATIENT
Start: 2022-07-01

## 2022-09-08 ENCOUNTER — TELEPHONE (OUTPATIENT)
Dept: PRIMARY CARE CLINIC | Age: 42
End: 2022-09-08

## 2022-09-13 LAB
ABSOLUTE BASO #: 0.06 K/UL (ref 0–0.2)
ABSOLUTE EOS #: 0.23 K/UL (ref 0–0.5)
ABSOLUTE LYMPH #: 2.48 K/UL (ref 1–4)
ABSOLUTE MONO #: 0.64 K/UL (ref 0.2–1)
ABSOLUTE NEUT #: 3.62 K/UL (ref 1.5–7.5)
BASOPHILS RELATIVE PERCENT: 0.9 %
EOSINOPHILS RELATIVE PERCENT: 3.3 %
HCT VFR BLD CALC: 42.9 % (ref 40–51)
HEMOGLOBIN: 14.5 G/DL (ref 13.5–17)
LYMPHOCYTE %: 35.2 %
MCH RBC QN AUTO: 28.7 PG (ref 25–33)
MCHC RBC AUTO-ENTMCNC: 33.8 G/DL (ref 31–36)
MCV RBC AUTO: 85 FL (ref 80–99)
MONOCYTES # BLD: 9.1 %
NEUTROPHILS RELATIVE PERCENT: 51.4 %
PDW BLD-RTO: 12.7 % (ref 11.5–15)
PLATELETS: 193 K/UL (ref 130–400)
PMV BLD AUTO: 12.6 FL (ref 9.3–13)
RBC: 5.05 M/UL (ref 4.5–6.1)
WBC: 7 K/UL (ref 3.5–11)

## 2022-09-14 LAB
ALT SERPL-CCNC: 20 U/L (ref 5–50)
ANION GAP SERPL CALCULATED.3IONS-SCNC: 11 MEQ/L (ref 7–16)
AST SERPL-CCNC: 19 U/L (ref 9–50)
BUN BLDV-MCNC: 13 MG/DL (ref 6–20)
CALCIUM SERPL-MCNC: 9.9 MG/DL (ref 8.5–10.5)
CHLORIDE BLD-SCNC: 100 MEQ/L (ref 95–107)
CHOLESTEROL/HDL RATIO: 2.5 RATIO
CHOLESTEROL: 210 MG/DL
CO2: 29 MEQ/L (ref 19–31)
CREAT SERPL-MCNC: 0.89 MG/DL (ref 0.8–1.4)
EGFR IF NONAFRICAN AMERICAN: 110 ML/MIN/1.73
GLUCOSE: 97 MG/DL (ref 70–99)
HDLC SERPL-MCNC: 83 MG/DL
LDL CHOLESTEROL CALCULATED: 105 MG/DL
LDL/HDL RATIO: 1.3 RATIO
POTASSIUM SERPL-SCNC: 4.5 MEQ/L (ref 3.5–5.4)
SODIUM BLD-SCNC: 140 MEQ/L (ref 133–146)
TRIGL SERPL-MCNC: 112 MG/DL
VLDLC SERPL CALC-MCNC: 22 MG/DL

## 2022-09-15 ENCOUNTER — TELEPHONE (OUTPATIENT)
Dept: PRIMARY CARE CLINIC | Age: 42
End: 2022-09-15

## 2022-09-15 ENCOUNTER — OFFICE VISIT (OUTPATIENT)
Dept: PRIMARY CARE CLINIC | Age: 42
End: 2022-09-15
Payer: COMMERCIAL

## 2022-09-15 VITALS
WEIGHT: 177.6 LBS | SYSTOLIC BLOOD PRESSURE: 118 MMHG | HEART RATE: 84 BPM | DIASTOLIC BLOOD PRESSURE: 72 MMHG | RESPIRATION RATE: 20 BRPM | OXYGEN SATURATION: 98 % | TEMPERATURE: 97.7 F | BODY MASS INDEX: 24.09 KG/M2

## 2022-09-15 DIAGNOSIS — R73.01 ELEVATED FASTING BLOOD SUGAR: ICD-10-CM

## 2022-09-15 DIAGNOSIS — F43.9 STRESS: Primary | ICD-10-CM

## 2022-09-15 DIAGNOSIS — R53.83 OTHER FATIGUE: ICD-10-CM

## 2022-09-15 LAB
FERRITIN: 322 NG/ML (ref 30–400)
FOLATE: 13.3 UG/L
HBA1C MFR BLD: 5.5 %
IRON SATURATION: 35 % (ref 20–50)
IRON, SERUM: 128 UG/DL (ref 59–158)
TOTAL IRON BINDING CAPACITY: 368 UG/DL (ref 250–450)
TSH SERPL DL<=0.05 MIU/L-ACNC: 2.04 UIU/ML (ref 0.4–4.1)
UNSATURATED IRON BINDING CAPACITY: 240 UG/DL (ref 112–347)
VITAMIN B-12: 561 PG/ML (ref 200–950)

## 2022-09-15 PROCEDURE — 99214 OFFICE O/P EST MOD 30 MIN: CPT | Performed by: NURSE PRACTITIONER

## 2022-09-15 PROCEDURE — 83036 HEMOGLOBIN GLYCOSYLATED A1C: CPT | Performed by: NURSE PRACTITIONER

## 2022-09-15 SDOH — ECONOMIC STABILITY: FOOD INSECURITY: WITHIN THE PAST 12 MONTHS, THE FOOD YOU BOUGHT JUST DIDN'T LAST AND YOU DIDN'T HAVE MONEY TO GET MORE.: PATIENT DECLINED

## 2022-09-15 SDOH — ECONOMIC STABILITY: FOOD INSECURITY: WITHIN THE PAST 12 MONTHS, YOU WORRIED THAT YOUR FOOD WOULD RUN OUT BEFORE YOU GOT MONEY TO BUY MORE.: PATIENT DECLINED

## 2022-09-15 ASSESSMENT — PATIENT HEALTH QUESTIONNAIRE - PHQ9
SUM OF ALL RESPONSES TO PHQ9 QUESTIONS 1 & 2: 1
2. FEELING DOWN, DEPRESSED OR HOPELESS: 1
SUM OF ALL RESPONSES TO PHQ QUESTIONS 1-9: 1
SUM OF ALL RESPONSES TO PHQ QUESTIONS 1-9: 1
1. LITTLE INTEREST OR PLEASURE IN DOING THINGS: 0
SUM OF ALL RESPONSES TO PHQ QUESTIONS 1-9: 1
SUM OF ALL RESPONSES TO PHQ QUESTIONS 1-9: 1

## 2022-09-15 ASSESSMENT — ENCOUNTER SYMPTOMS
SORE THROAT: 0
SHORTNESS OF BREATH: 0
VISUAL CHANGE: 0
RHINORRHEA: 0
NAUSEA: 0
COUGH: 0
VOMITING: 0
CONSTIPATION: 0
DIARRHEA: 0
WHEEZING: 0
ABDOMINAL PAIN: 0
SWOLLEN GLANDS: 0
CHANGE IN BOWEL HABIT: 0

## 2022-09-15 ASSESSMENT — SOCIAL DETERMINANTS OF HEALTH (SDOH): HOW HARD IS IT FOR YOU TO PAY FOR THE VERY BASICS LIKE FOOD, HOUSING, MEDICAL CARE, AND HEATING?: PATIENT DECLINED

## 2022-09-15 NOTE — PATIENT INSTRUCTIONS
SURVEY:     You may be receiving a survey from [employer] regarding your visit today. Please complete the survey to enable us to provide the highest quality of care to you and your family. If you cannot score us a very good on any question, please call the office to discuss how we could have made your experience a very good one.      Thank you,    Niecy Jean, APRN-CNP  Celestina Tadeo, APRN-CNP  Kecia Asencio, MIKE Leone, CMA  Kathy Herrera, CMA  Milena, CMA  Katt, PCA  Dinaa, PM

## 2022-09-15 NOTE — PROGRESS NOTES
Name: Henry Khoury  : 1980         Chief Complaint:     Chief Complaint   Patient presents with    Mental Health Problem     Routine check. Fatigue     X 1 month. History of Present Illness:      Henry Khoury is a 42 y.o.  male who presents with Mental Health Problem (Routine check. ) and Fatigue (X 1 month.)      Lucie Monsivais is here today for a routine office visit. Stress-stable, patient compliant with medication and states current dose is very helpful. See below for further comment. Elevated blood sugar-stable, A1c is 5.5 in the office today. Patient remains soda free. States he usually packs his lunch and tries to eat a lot of lean meats. Mental Health Problem  The primary symptoms do not include dysphoric mood, delusions, hallucinations, bizarre behavior, disorganized speech, negative symptoms or somatic symptoms. Primary symptoms comment: NERVOUSNESS. The current episode started more than 1 month ago. This is a chronic problem. The onset of the illness is precipitated by emotional stress. The degree of incapacity that he is experiencing as a consequence of his illness is moderate. Sequelae of the illness include harmed interpersonal relations and an inability to work. Additional symptoms of the illness include fatigue and agitation (rare). Additional symptoms of the illness do not include anhedonia, insomnia, hypersomnia, appetite change, unexpected weight change, psychomotor retardation, feelings of worthlessness, attention impairment, euphoric mood, increased goal-directed activity, flight of ideas, inflated self-esteem, decreased need for sleep, distractible, poor judgment, visual change, headaches, abdominal pain or seizures. Associated symptoms comments: SYMPTOMS REMAIN IMPROVED WITH USE OF LEXAPRO. He does not admit to suicidal ideas. He does not have a plan to attempt suicide. He does not contemplate harming himself. He has not already injured self.  He does not contemplate injuring another person. He has not already  injured another person. Risk factors that are present for mental illness include a family history of mental illness. Fatigue  This is a new problem. The current episode started more than 1 month ago. The problem occurs intermittently. The problem has been waxing and waning. Associated symptoms include fatigue. Pertinent negatives include no abdominal pain, anorexia, arthralgias, change in bowel habit, chest pain, chills, congestion, coughing, diaphoresis, fever, headaches, joint swelling, myalgias, nausea, neck pain, numbness, rash, sore throat, swollen glands, urinary symptoms, vertigo, visual change, vomiting or weakness. Nothing aggravates the symptoms. He has tried rest and sleep for the symptoms. The treatment provided mild relief. Past Medical History:     History reviewed. No pertinent past medical history. Reviewed all health maintenance requirements and ordered appropriate tests  There are no preventive care reminders to display for this patient. Past Surgical History:     History reviewed. No pertinent surgical history. Medications:       Prior to Admission medications    Medication Sig Start Date End Date Taking? Authorizing Provider   escitalopram (LEXAPRO) 10 MG tablet Take 1.5 tablets by mouth daily 7/1/22  Yes Delphine Jean, APRN - CNP        Allergies:       Penicillins    Social History:     Tobacco:    reports that he has quit smoking. He has never used smokeless tobacco.  Alcohol:      reports current alcohol use. Drug Use:  reports no history of drug use. Family History:     History reviewed. No pertinent family history. Review of Systems:     Positive and Negative as described in HPI    Review of Systems   Constitutional:  Positive for fatigue. Negative for appetite change, chills, diaphoresis, fever and unexpected weight change. HENT:  Negative for congestion, rhinorrhea and sore throat.     Eyes:  Negative for visual disturbance. Respiratory:  Negative for cough, shortness of breath and wheezing. Cardiovascular:  Negative for chest pain and palpitations. Gastrointestinal:  Negative for abdominal pain, anorexia, change in bowel habit, constipation, diarrhea, nausea and vomiting. Genitourinary:  Negative for difficulty urinating and dysuria. Musculoskeletal:  Negative for arthralgias, gait problem, joint swelling, myalgias, neck pain and neck stiffness. Skin:  Negative for rash. Neurological:  Negative for dizziness, vertigo, seizures, syncope, weakness, light-headedness, numbness and headaches. Psychiatric/Behavioral:  Positive for agitation (rare). Negative for behavioral problems, confusion, decreased concentration, dysphoric mood, hallucinations, self-injury, sleep disturbance and suicidal ideas. The patient is not nervous/anxious, does not have insomnia and is not hyperactive. Physical Exam:   Vitals:  /72 (Position: Sitting)   Pulse 84   Temp 97.7 °F (36.5 °C) (Temporal)   Resp 20   Wt 177 lb 9.6 oz (80.6 kg)   SpO2 98%   BMI 24.09 kg/m²     Physical Exam  Vitals and nursing note reviewed. Constitutional:       General: He is not in acute distress. Appearance: Normal appearance. He is not ill-appearing. HENT:      Mouth/Throat:      Mouth: Mucous membranes are moist.   Eyes:      General: No scleral icterus. Conjunctiva/sclera: Conjunctivae normal.   Neck:      Vascular: No carotid bruit. Cardiovascular:      Rate and Rhythm: Normal rate and regular rhythm. Heart sounds: No murmur heard. Pulmonary:      Effort: Pulmonary effort is normal.      Breath sounds: Normal breath sounds. No wheezing. Abdominal:      General: Bowel sounds are normal. There is no distension. Palpations: Abdomen is soft. Tenderness: There is no abdominal tenderness. Musculoskeletal:         General: Normal range of motion. Cervical back: Normal range of motion and neck supple. Skin:     General: Skin is warm and dry. Findings: No rash. Neurological:      Mental Status: He is alert and oriented to person, place, and time. Psychiatric:         Attention and Perception: Attention normal.         Mood and Affect: Mood and affect normal. Mood is not anxious or depressed. Speech: Speech normal.         Behavior: Behavior normal. Behavior is cooperative. Thought Content: Thought content normal. Thought content does not include homicidal or suicidal ideation. Thought content does not include homicidal or suicidal plan. Cognition and Memory: Cognition normal.         Judgment: Judgment normal.       Data:     Lab Results   Component Value Date/Time     09/13/2022 08:09 AM    K 4.5 09/13/2022 08:09 AM     09/13/2022 08:09 AM    CO2 29 09/13/2022 08:09 AM    BUN 13 09/13/2022 08:09 AM    CREATININE 0.89 09/13/2022 08:09 AM    GLUCOSE 97 09/13/2022 08:09 AM    AST 19 09/13/2022 08:09 AM    ALT 20 09/13/2022 08:09 AM     Lab Results   Component Value Date/Time    WBC 7.0 09/13/2022 08:09 AM    RBC 5.05 09/13/2022 08:09 AM    HGB 14.5 09/13/2022 08:09 AM    HCT 42.9 09/13/2022 08:09 AM    MCV 85.0 09/13/2022 08:09 AM    MCH 28.7 09/13/2022 08:09 AM    MCHC 33.8 09/13/2022 08:09 AM    RDW 12.7 09/13/2022 08:09 AM     09/13/2022 08:09 AM    MPV 12.6 09/13/2022 08:09 AM     Lab Results   Component Value Date/Time    TSH 2.04 09/13/2022 08:09 AM     Lab Results   Component Value Date/Time    CHOL 210 09/13/2022 08:09 AM    HDL 83 09/13/2022 08:09 AM    LABA1C 5.5 09/15/2022 09:15 AM       Assessment/Plan:      Diagnosis Orders   1. Stress        2. Other fatigue  TSH With Reflex Ft4    Iron and TIBC    Ferritin    Vitamin B12    Folate      3. Elevated fasting blood sugar  POCT glycosylated hemoglobin (Hb A1C)        Continue current medication. Mood is stable. Labs as ordered for fatigue. We will follow with results.     We will see him back in 6 months for recheck, sooner if any issues. 1.  Nikhil received counseling on the following healthy behaviors: nutrition, exercise, and medication adherence  2. Patient given educational materials - see patient instructions  3. Was a self-tracking handout given in paper form or via [employer]? No  If yes, see orders or list here. 4.  Discussed use, benefit, and side effects of prescribed medications. Barriers to medication compliance addressed. All patient questions answered. Pt voiced understanding. 5.  Reviewed prior labs and health maintenance  6. Continue current medications, diet and exercise. Completed Refills   Requested Prescriptions      No prescriptions requested or ordered in this encounter         Return in about 6 months (around 3/15/2023) for Check up.

## 2022-09-15 NOTE — TELEPHONE ENCOUNTER
----- Message from [address] [place] [address], MARQUITA - CNP sent at 9/15/2022  2:13 PM EDT -----  Results are normal, please call patient and make them aware.

## 2023-01-17 ENCOUNTER — OFFICE VISIT (OUTPATIENT)
Dept: PRIMARY CARE CLINIC | Age: 43
End: 2023-01-17
Payer: COMMERCIAL

## 2023-01-17 VITALS
OXYGEN SATURATION: 96 % | HEART RATE: 94 BPM | WEIGHT: 184.6 LBS | DIASTOLIC BLOOD PRESSURE: 78 MMHG | RESPIRATION RATE: 18 BRPM | BODY MASS INDEX: 25.04 KG/M2 | SYSTOLIC BLOOD PRESSURE: 130 MMHG | TEMPERATURE: 97 F

## 2023-01-17 DIAGNOSIS — H60.311 ACUTE DIFFUSE OTITIS EXTERNA OF RIGHT EAR: Primary | ICD-10-CM

## 2023-01-17 DIAGNOSIS — F43.9 STRESS: ICD-10-CM

## 2023-01-17 DIAGNOSIS — H65.91 OME (OTITIS MEDIA WITH EFFUSION), RIGHT: ICD-10-CM

## 2023-01-17 PROCEDURE — 99214 OFFICE O/P EST MOD 30 MIN: CPT | Performed by: NURSE PRACTITIONER

## 2023-01-17 RX ORDER — CIPROFLOXACIN AND DEXAMETHASONE 3; 1 MG/ML; MG/ML
4 SUSPENSION/ DROPS AURICULAR (OTIC) 2 TIMES DAILY
Qty: 7.5 ML | Refills: 0 | Status: SHIPPED | OUTPATIENT
Start: 2023-01-17 | End: 2023-01-24

## 2023-01-17 RX ORDER — ESCITALOPRAM OXALATE 10 MG/1
15 TABLET ORAL DAILY
Qty: 135 TABLET | Refills: 3 | Status: SHIPPED | OUTPATIENT
Start: 2023-01-17

## 2023-01-17 RX ORDER — CLINDAMYCIN HYDROCHLORIDE 300 MG/1
300 CAPSULE ORAL 3 TIMES DAILY
Qty: 21 CAPSULE | Refills: 0 | Status: SHIPPED | OUTPATIENT
Start: 2023-01-17 | End: 2023-01-24

## 2023-01-17 ASSESSMENT — ENCOUNTER SYMPTOMS
RHINORRHEA: 0
ABDOMINAL PAIN: 0
VOMITING: 0
SORE THROAT: 0
WHEEZING: 0
CONSTIPATION: 0
VISUAL CHANGE: 0
DIARRHEA: 0
COUGH: 0
SHORTNESS OF BREATH: 0
NAUSEA: 0

## 2023-01-17 ASSESSMENT — PATIENT HEALTH QUESTIONNAIRE - PHQ9
SUM OF ALL RESPONSES TO PHQ QUESTIONS 1-9: 0
SUM OF ALL RESPONSES TO PHQ9 QUESTIONS 1 & 2: 0
SUM OF ALL RESPONSES TO PHQ QUESTIONS 1-9: 0
SUM OF ALL RESPONSES TO PHQ QUESTIONS 1-9: 0
2. FEELING DOWN, DEPRESSED OR HOPELESS: 0
1. LITTLE INTEREST OR PLEASURE IN DOING THINGS: 0
SUM OF ALL RESPONSES TO PHQ QUESTIONS 1-9: 0

## 2023-01-17 NOTE — PROGRESS NOTES
Name: Uvaldo Pereyra  : 1980         Chief Complaint:     Chief Complaint   Patient presents with    Otalgia     Right ear, X 6 days. History of Present Illness:      Uvaldo Pereyra is a 36 y.o.  male who presents with Otalgia (Right ear, X 6 days.)      Leno Sidhu is here today for a routine office visit. Otalgia   There is pain in the right ear. This is a new problem. The current episode started in the past 7 days. The problem occurs constantly. The problem has been gradually worsening. There has been no fever. The pain is at a severity of 5/10. The pain is moderate. Pertinent negatives include no abdominal pain, coughing, diarrhea, ear discharge, headaches, hearing loss, neck pain, rash, rhinorrhea, sore throat or vomiting. He has tried acetaminophen and NSAIDs for the symptoms. The treatment provided mild relief. There is no history of a chronic ear infection, hearing loss or a tympanostomy tube. Mental Health Problem  The primary symptoms do not include dysphoric mood, delusions, hallucinations, bizarre behavior, disorganized speech, negative symptoms or somatic symptoms. Primary symptoms comment: NERVOUSNESS. The current episode started more than 1 month ago. This is a chronic problem. The onset of the illness is precipitated by emotional stress. The degree of incapacity that he is experiencing as a consequence of his illness is moderate. Sequelae of the illness include harmed interpersonal relations and an inability to work. Additional symptoms of the illness include agitation (rare). Additional symptoms of the illness do not include anhedonia, insomnia, hypersomnia, appetite change, unexpected weight change, fatigue, psychomotor retardation, feelings of worthlessness, attention impairment, euphoric mood, increased goal-directed activity, flight of ideas, inflated self-esteem, decreased need for sleep, distractible, poor judgment, visual change, headaches, abdominal pain or seizures.  Associated symptoms comments: SYMPTOMS REMAIN IMPROVED WITH USE OF LEXAPRO. He does not admit to suicidal ideas. He does not have a plan to attempt suicide. He does not contemplate harming himself. He has not already injured self. He does not contemplate injuring another person. He has not already  injured another person. Risk factors that are present for mental illness include a family history of mental illness. Past Medical History:     History reviewed. No pertinent past medical history. Reviewed all health maintenance requirements and ordered appropriate tests  There are no preventive care reminders to display for this patient. Past Surgical History:     History reviewed. No pertinent surgical history. Medications:       Prior to Admission medications    Medication Sig Start Date End Date Taking? Authorizing Provider   escitalopram (LEXAPRO) 10 MG tablet Take 1.5 tablets by mouth daily 1/17/23  Yes MARQUITA Ovalle CNP   ciprofloxacin-dexamethasone (CIPRODEX) 0.3-0.1 % otic suspension Place 4 drops into the right ear 2 times daily for 7 days 1/17/23 1/24/23 Yes MARQUITA Ovalle CNP   clindamycin (CLEOCIN) 300 MG capsule Take 1 capsule by mouth 3 times daily for 7 days 1/17/23 1/24/23 Yes MARQUITA Ovalle CNP        Allergies:       Penicillins    Social History:     Tobacco:    reports that he has quit smoking. He has never used smokeless tobacco.  Alcohol:      reports current alcohol use. Drug Use:  reports no history of drug use. Family History:     History reviewed. No pertinent family history. Review of Systems:     Positive and Negative as described in HPI    Review of Systems   Constitutional:  Negative for appetite change, chills, diaphoresis, fatigue, fever and unexpected weight change. HENT:  Positive for ear pain. Negative for congestion, ear discharge, hearing loss, rhinorrhea and sore throat. Eyes:  Negative for visual disturbance.    Respiratory:  Negative for cough, shortness of breath and wheezing. Cardiovascular:  Negative for chest pain and palpitations. Gastrointestinal:  Negative for abdominal pain, constipation, diarrhea, nausea and vomiting. Genitourinary:  Negative for difficulty urinating and dysuria. Musculoskeletal:  Negative for arthralgias, gait problem, joint swelling, myalgias, neck pain and neck stiffness. Skin:  Negative for rash. Neurological:  Negative for dizziness, seizures, syncope, weakness, light-headedness, numbness and headaches. Psychiatric/Behavioral:  Positive for agitation (rare). Negative for behavioral problems, confusion, decreased concentration, dysphoric mood, hallucinations, self-injury, sleep disturbance and suicidal ideas. The patient is not nervous/anxious, does not have insomnia and is not hyperactive. Physical Exam:   Vitals:  /78   Pulse 94   Temp 97 °F (36.1 °C) (Temporal)   Resp 18   Wt 184 lb 9.6 oz (83.7 kg)   SpO2 96%   BMI 25.04 kg/m²     Physical Exam  Vitals and nursing note reviewed. Constitutional:       General: He is not in acute distress. Appearance: Normal appearance. He is not ill-appearing. HENT:      Right Ear: Swelling and tenderness present. No drainage. A middle ear effusion is present. Tympanic membrane is erythematous and bulging. Tympanic membrane is not perforated or retracted. Mouth/Throat:      Mouth: Mucous membranes are moist.   Eyes:      General: No scleral icterus. Conjunctiva/sclera: Conjunctivae normal.   Neck:      Vascular: No carotid bruit. Cardiovascular:      Rate and Rhythm: Normal rate and regular rhythm. Heart sounds: No murmur heard. Pulmonary:      Effort: Pulmonary effort is normal.      Breath sounds: Normal breath sounds. No wheezing. Abdominal:      General: Bowel sounds are normal. There is no distension. Palpations: Abdomen is soft. Tenderness: There is no abdominal tenderness.    Musculoskeletal:         General: Normal range of motion. Cervical back: Normal range of motion and neck supple. Skin:     General: Skin is warm and dry. Findings: No rash. Neurological:      Mental Status: He is alert and oriented to person, place, and time. Psychiatric:         Attention and Perception: Attention normal.         Mood and Affect: Mood and affect normal. Mood is not anxious or depressed. Speech: Speech normal.         Behavior: Behavior normal. Behavior is cooperative. Thought Content: Thought content normal. Thought content does not include homicidal or suicidal ideation. Thought content does not include homicidal or suicidal plan. Cognition and Memory: Cognition normal.         Judgment: Judgment normal.       Data:     Lab Results   Component Value Date/Time     09/13/2022 08:09 AM    K 4.5 09/13/2022 08:09 AM     09/13/2022 08:09 AM    CO2 29 09/13/2022 08:09 AM    BUN 13 09/13/2022 08:09 AM    CREATININE 0.89 09/13/2022 08:09 AM    GLUCOSE 97 09/13/2022 08:09 AM    AST 19 09/13/2022 08:09 AM    ALT 20 09/13/2022 08:09 AM     Lab Results   Component Value Date/Time    WBC 7.0 09/13/2022 08:09 AM    RBC 5.05 09/13/2022 08:09 AM    HGB 14.5 09/13/2022 08:09 AM    HCT 42.9 09/13/2022 08:09 AM    MCV 85.0 09/13/2022 08:09 AM    MCH 28.7 09/13/2022 08:09 AM    MCHC 33.8 09/13/2022 08:09 AM    RDW 12.7 09/13/2022 08:09 AM     09/13/2022 08:09 AM    MPV 12.6 09/13/2022 08:09 AM     Lab Results   Component Value Date/Time    TSH 2.04 09/13/2022 08:09 AM     Lab Results   Component Value Date/Time    CHOL 210 09/13/2022 08:09 AM    HDL 83 09/13/2022 08:09 AM    LABA1C 5.5 09/15/2022 09:15 AM       Assessment/Plan:      Diagnosis Orders   1. Acute diffuse otitis externa of right ear  ciprofloxacin-dexamethasone (CIPRODEX) 0.3-0.1 % otic suspension      2. OME (otitis media with effusion), right  clindamycin (CLEOCIN) 300 MG capsule      3.  Stress  escitalopram (LEXAPRO) 10 MG tablet Medications as instructed for otitis. Call if not improving in a few days. Sooner if any worsening. Continue escitalopram at current dose. Mood is well controlled. We will see him back in 6 months for routine check, sooner if any issues. 1.  Nikhil received counseling on the following healthy behaviors: nutrition, exercise, and medication adherence  2. Patient given educational materials - see patient instructions  3. Was a self-tracking handout given in paper form or via [employer]? No  If yes, see orders or list here. 4.  Discussed use, benefit, and side effects of prescribed medications. Barriers to medication compliance addressed. All patient questions answered. Pt voiced understanding. 5.  Reviewed prior labs and health maintenance  6. Continue current medications, diet and exercise. Completed Refills   Requested Prescriptions     Signed Prescriptions Disp Refills    escitalopram (LEXAPRO) 10 MG tablet 135 tablet 3     Sig: Take 1.5 tablets by mouth daily    ciprofloxacin-dexamethasone (CIPRODEX) 0.3-0.1 % otic suspension 7.5 mL 0     Sig: Place 4 drops into the right ear 2 times daily for 7 days    clindamycin (CLEOCIN) 300 MG capsule 21 capsule 0     Sig: Take 1 capsule by mouth 3 times daily for 7 days         Return in about 6 months (around 7/17/2023) for Check up.

## 2023-01-17 NOTE — LETTER
[facility]  [address]  [place] [address]  Phone: [phone]  Fax: [phone] [facility], APRN - CNP        January 17, 2023     Patient: Juana Blair   YOB: 1980   Date of Visit: 1/17/2023       To Whom it May Concern:    Eugenio Stone was seen in my clinic on 1/17/2023. He may return to work on 1/19/2023. If you have any questions or concerns, please don't hesitate to call.     Sincerely,         Jasmyn Jean, APRN - CNP

## 2023-01-17 NOTE — PATIENT INSTRUCTIONS
SURVEY:     You may be receiving a survey from [employer] regarding your visit today. Please complete the survey to enable us to provide the highest quality of care to you and your family. If you cannot score us a very good on any question, please call the office to discuss how we could have made your experience a very good one.      Thank you,    Jag Jean, APRN-TANA Soni, APRN-CNP Caldwell Buerger, LPN Davied Reese, HERBERT Baez, HERBERT Abbott, CMA  Katt, LEISA Ortiz, PM

## 2023-07-17 SDOH — ECONOMIC STABILITY: HOUSING INSECURITY
IN THE LAST 12 MONTHS, WAS THERE A TIME WHEN YOU DID NOT HAVE A STEADY PLACE TO SLEEP OR SLEPT IN A SHELTER (INCLUDING NOW)?: PATIENT REFUSED

## 2023-07-17 SDOH — ECONOMIC STABILITY: FOOD INSECURITY: WITHIN THE PAST 12 MONTHS, THE FOOD YOU BOUGHT JUST DIDN'T LAST AND YOU DIDN'T HAVE MONEY TO GET MORE.: PATIENT DECLINED

## 2023-07-17 SDOH — ECONOMIC STABILITY: FOOD INSECURITY: WITHIN THE PAST 12 MONTHS, YOU WORRIED THAT YOUR FOOD WOULD RUN OUT BEFORE YOU GOT MONEY TO BUY MORE.: PATIENT DECLINED

## 2023-07-17 SDOH — ECONOMIC STABILITY: INCOME INSECURITY: HOW HARD IS IT FOR YOU TO PAY FOR THE VERY BASICS LIKE FOOD, HOUSING, MEDICAL CARE, AND HEATING?: PATIENT DECLINED

## 2023-07-17 SDOH — ECONOMIC STABILITY: TRANSPORTATION INSECURITY
IN THE PAST 12 MONTHS, HAS LACK OF TRANSPORTATION KEPT YOU FROM MEETINGS, WORK, OR FROM GETTING THINGS NEEDED FOR DAILY LIVING?: PATIENT DECLINED

## 2023-07-18 ENCOUNTER — HOSPITAL ENCOUNTER (OUTPATIENT)
Age: 43
Discharge: HOME | End: 2023-07-20

## 2023-07-18 ENCOUNTER — OFFICE VISIT (OUTPATIENT)
Dept: PRIMARY CARE CLINIC | Age: 43
End: 2023-07-18

## 2023-07-18 ENCOUNTER — HOSPITAL ENCOUNTER (OUTPATIENT)
Dept: GENERAL RADIOLOGY | Age: 43
Discharge: HOME | End: 2023-07-20

## 2023-07-18 VITALS
TEMPERATURE: 98.7 F | HEIGHT: 72 IN | OXYGEN SATURATION: 100 % | HEART RATE: 82 BPM | BODY MASS INDEX: 25.32 KG/M2 | WEIGHT: 186.9 LBS | RESPIRATION RATE: 20 BRPM | SYSTOLIC BLOOD PRESSURE: 124 MMHG | DIASTOLIC BLOOD PRESSURE: 78 MMHG

## 2023-07-18 DIAGNOSIS — F43.9 STRESS: Primary | ICD-10-CM

## 2023-07-18 DIAGNOSIS — M25.541 ARTHRALGIA OF RIGHT HAND: ICD-10-CM

## 2023-07-18 LAB
C-REACTIVE PROTEIN: 0.5 MG/DL
RHEUMATOID FACTOR: 11 IU/ML
SEDIMENTATION RATE, ERYTHROCYTE: 16 MM/HR (ref 0–15)

## 2023-07-18 PROCEDURE — 73130 X-RAY EXAM OF HAND: CPT

## 2023-07-18 PROCEDURE — 99214 OFFICE O/P EST MOD 30 MIN: CPT | Performed by: NURSE PRACTITIONER

## 2023-07-18 RX ORDER — MELOXICAM 15 MG/1
15 TABLET ORAL DAILY PRN
Qty: 90 TABLET | Refills: 1 | Status: SHIPPED | OUTPATIENT
Start: 2023-07-18

## 2023-07-18 ASSESSMENT — ENCOUNTER SYMPTOMS
ABDOMINAL PAIN: 0
VOMITING: 0
CONSTIPATION: 0
COUGH: 0
NAUSEA: 0
SORE THROAT: 0
SHORTNESS OF BREATH: 0
WHEEZING: 0
DIARRHEA: 0
RHINORRHEA: 0
VISUAL CHANGE: 0

## 2023-07-18 ASSESSMENT — PATIENT HEALTH QUESTIONNAIRE - PHQ9
SUM OF ALL RESPONSES TO PHQ9 QUESTIONS 1 & 2: 0
SUM OF ALL RESPONSES TO PHQ QUESTIONS 1-9: 0
1. LITTLE INTEREST OR PLEASURE IN DOING THINGS: 0
2. FEELING DOWN, DEPRESSED OR HOPELESS: 0

## 2023-07-18 NOTE — PATIENT INSTRUCTIONS
SURVEY:     You may be receiving a survey from [employer] regarding your visit today. Please complete the survey to enable us to provide the highest quality of care to you and your family. If you cannot score us a very good on any question, please call the office to discuss how we could have made your experience a very good one.      Thank you,    Nader Jean, APRN-CNP  Wenceslao Severance, APRN-CNP  Eleanora Buerger, MIKE Patel, HERBERT Rai, HERBERT Abbott, CMA  Katt, PCA  Diana, PM

## 2023-07-18 NOTE — PROGRESS NOTES
GARETT Screen With Reflex    Sedimentation Rate    C-Reactive Protein    XR HAND RIGHT (MIN 3 VIEWS)    meloxicam (MOBIC) 15 MG tablet        We will continue all current medications. Mood is well controlled. Laboratory studies and x-ray ordered. We will follow with results. In the meantime he may start meloxicam 15 mg daily. He can also hold off until we get results. We will see him back in 6 months for routine office visit, sooner if any issues. 1.  Nikhil received counseling on the following healthy behaviors: nutrition, exercise, and medication adherence  2. Patient given educational materials - see patient instructions  3. Was a self-tracking handout given in paper form or via [email or web address]? No  If yes, see orders or list here. 4.  Discussed use, benefit, and side effects of prescribed medications. Barriers to medication compliance addressed. All patient questions answered. Pt voiced understanding. 5.  Reviewed prior labs and health maintenance  6. Continue current medications, diet and exercise. Completed Refills   Requested Prescriptions     Signed Prescriptions Disp Refills    meloxicam (MOBIC) 15 MG tablet 90 tablet 1     Sig: Take 1 tablet by mouth daily as needed for Pain         Return in about 6 months (around 1/18/2024) for Check up.

## 2023-07-21 ENCOUNTER — TELEPHONE (OUTPATIENT)
Dept: PRIMARY CARE CLINIC | Age: 43
End: 2023-07-21

## 2023-07-21 LAB
ANA PATTERN: NORMAL
ANTI-NUCLEAR ANTIBODY (ANA): NEGATIVE
CENTROMERE PATTERN: NEGATIVE TITER
COARSE SPECKLED PATTERN: NEGATIVE TITER
COMMENTS: NORMAL
CYTO RETICULAR (MITO) PATTERN: NEGATIVE
DENSE FINE SPECKLED PATTERN: NEGATIVE TITER
DISCRETE NUCLEAR DOTS PATTERN: NEGATIVE TITER
HOMOGENEOUS PATTERN: NEGATIVE TITER
METHOD: NORMAL
NUCLEAR MEMBRANE PATTERN: NEGATIVE TITER
NUCLEOLAR PATTERN: NEGATIVE TITER
SPECKLED PATTERN: NEGATIVE TITER

## 2023-07-21 NOTE — TELEPHONE ENCOUNTER
----- Message from [address] [facility] Anthony, MARQUITA - CNP sent at 7/21/2023  6:58 AM EDT -----  Results are negative for rheumatoid arthritis.

## 2023-07-24 ENCOUNTER — TELEPHONE (OUTPATIENT)
Dept: PRIMARY CARE CLINIC | Age: 43
End: 2023-07-24

## 2023-07-24 NOTE — TELEPHONE ENCOUNTER
----- Message from [address] [facility] MARQUITA Cruz - CNP sent at 7/23/2023  9:21 AM EDT -----  Results are normal, please call patient and make them aware.

## 2024-01-17 DIAGNOSIS — F43.9 STRESS: ICD-10-CM

## 2024-01-17 RX ORDER — ESCITALOPRAM OXALATE 10 MG/1
TABLET ORAL
Qty: 135 TABLET | Refills: 3 | Status: SHIPPED | OUTPATIENT
Start: 2024-01-17

## 2024-01-17 NOTE — TELEPHONE ENCOUNTER
Health Maintenance   Topic Date Due    Hepatitis B vaccine (1 of 3 - 3-dose series) Never done    HIV screen  Never done    Hepatitis C screen  Never done    DTaP/Tdap/Td vaccine (1 - Tdap) Never done    Flu vaccine (1) Never done    COVID-19 Vaccine (3 - 2023-24 season) 09/01/2023    Depression Screen  07/18/2024    Lipids  09/13/2027    Hepatitis A vaccine  Aged Out    Hib vaccine  Aged Out    HPV vaccine  Aged Out    Polio vaccine  Aged Out    Meningococcal (ACWY) vaccine  Aged Out    Pneumococcal 0-64 years Vaccine  Aged Out    A1C test (Diabetic or Prediabetic)  Discontinued    Diabetes screen  Discontinued             (applicable per patient's age: Cancer Screenings, Depression Screening, Fall Risk Screening, Immunizations)    Hemoglobin A1C (%)   Date Value   09/15/2022 5.5   08/17/2021 5.8     LDL Calculated (mg/dL)   Date Value   09/13/2022 105 (H)     AST (U/L)   Date Value   09/13/2022 19     ALT (U/L)   Date Value   09/13/2022 20     BUN (mg/dL)   Date Value   09/13/2022 13      (goal A1C is < 7)   (goal LDL is <100) need 30-50% reduction from baseline     BP Readings from Last 3 Encounters:   07/18/23 124/78   01/17/23 130/78   09/15/22 118/72    (goal /80)      All Future Testing planned in [facility]:  Lab Frequency Next Occurrence   Rheumatoid Factor Once 07/18/2023   GARETT Screen With Reflex Once 07/18/2023   Sedimentation Rate Once 07/18/2023   C-Reactive Protein Once 07/18/2023       Next Visit Date:  Future Appointments   Date Time Provider Department Center   1/18/2024  9:00 AM Demar Jean, APRN - CNP [facility]            Patient Active Problem List:     Stress     Elevated blood sugar

## 2024-01-18 ENCOUNTER — OFFICE VISIT (OUTPATIENT)
Dept: PRIMARY CARE CLINIC | Age: 44
End: 2024-01-18
Payer: COMMERCIAL

## 2024-01-18 VITALS
DIASTOLIC BLOOD PRESSURE: 74 MMHG | HEART RATE: 78 BPM | SYSTOLIC BLOOD PRESSURE: 122 MMHG | WEIGHT: 188.6 LBS | RESPIRATION RATE: 20 BRPM | TEMPERATURE: 98.5 F | BODY MASS INDEX: 25.58 KG/M2 | OXYGEN SATURATION: 98 %

## 2024-01-18 DIAGNOSIS — M25.542 ARTHRALGIA OF BOTH HANDS: ICD-10-CM

## 2024-01-18 DIAGNOSIS — H65.01 NON-RECURRENT ACUTE SEROUS OTITIS MEDIA OF RIGHT EAR: ICD-10-CM

## 2024-01-18 DIAGNOSIS — F43.9 STRESS: Primary | ICD-10-CM

## 2024-01-18 DIAGNOSIS — M25.541 ARTHRALGIA OF BOTH HANDS: ICD-10-CM

## 2024-01-18 DIAGNOSIS — J01.00 ACUTE NON-RECURRENT MAXILLARY SINUSITIS: ICD-10-CM

## 2024-01-18 PROCEDURE — G8484 FLU IMMUNIZE NO ADMIN: HCPCS | Performed by: NURSE PRACTITIONER

## 2024-01-18 PROCEDURE — 1036F TOBACCO NON-USER: CPT | Performed by: NURSE PRACTITIONER

## 2024-01-18 PROCEDURE — 99214 OFFICE O/P EST MOD 30 MIN: CPT | Performed by: NURSE PRACTITIONER

## 2024-01-18 PROCEDURE — G8427 DOCREV CUR MEDS BY ELIG CLIN: HCPCS | Performed by: NURSE PRACTITIONER

## 2024-01-18 PROCEDURE — G8419 CALC BMI OUT NRM PARAM NOF/U: HCPCS | Performed by: NURSE PRACTITIONER

## 2024-01-18 RX ORDER — CELECOXIB 100 MG/1
100 CAPSULE ORAL 2 TIMES DAILY
Qty: 180 CAPSULE | Refills: 0 | Status: SHIPPED | OUTPATIENT
Start: 2024-01-18

## 2024-01-18 RX ORDER — PREDNISONE 20 MG/1
40 TABLET ORAL DAILY
Qty: 10 TABLET | Refills: 0 | Status: SHIPPED | OUTPATIENT
Start: 2024-01-18 | End: 2024-01-23

## 2024-01-18 RX ORDER — AZITHROMYCIN 250 MG/1
500 TABLET, FILM COATED ORAL DAILY
Qty: 6 TABLET | Refills: 0 | Status: SHIPPED | OUTPATIENT
Start: 2024-01-18 | End: 2024-01-18 | Stop reason: SDUPTHER

## 2024-01-18 RX ORDER — AZITHROMYCIN 250 MG/1
500 TABLET, FILM COATED ORAL DAILY
Qty: 6 TABLET | Refills: 0 | Status: SHIPPED | OUTPATIENT
Start: 2024-01-18 | End: 2024-01-21

## 2024-01-18 SDOH — ECONOMIC STABILITY: HOUSING INSECURITY
IN THE LAST 12 MONTHS, WAS THERE A TIME WHEN YOU DID NOT HAVE A STEADY PLACE TO SLEEP OR SLEPT IN A SHELTER (INCLUDING NOW)?: PATIENT DECLINED

## 2024-01-18 SDOH — ECONOMIC STABILITY: FOOD INSECURITY: WITHIN THE PAST 12 MONTHS, THE FOOD YOU BOUGHT JUST DIDN'T LAST AND YOU DIDN'T HAVE MONEY TO GET MORE.: PATIENT DECLINED

## 2024-01-18 SDOH — ECONOMIC STABILITY: FOOD INSECURITY: WITHIN THE PAST 12 MONTHS, YOU WORRIED THAT YOUR FOOD WOULD RUN OUT BEFORE YOU GOT MONEY TO BUY MORE.: PATIENT DECLINED

## 2024-01-18 SDOH — ECONOMIC STABILITY: INCOME INSECURITY: HOW HARD IS IT FOR YOU TO PAY FOR THE VERY BASICS LIKE FOOD, HOUSING, MEDICAL CARE, AND HEATING?: PATIENT DECLINED

## 2024-01-18 ASSESSMENT — PATIENT HEALTH QUESTIONNAIRE - PHQ9
SUM OF ALL RESPONSES TO PHQ QUESTIONS 1-9: 0
2. FEELING DOWN, DEPRESSED OR HOPELESS: 0
1. LITTLE INTEREST OR PLEASURE IN DOING THINGS: 0
SUM OF ALL RESPONSES TO PHQ QUESTIONS 1-9: 0
SUM OF ALL RESPONSES TO PHQ9 QUESTIONS 1 & 2: 0

## 2024-01-18 ASSESSMENT — ENCOUNTER SYMPTOMS
NAUSEA: 0
ABDOMINAL PAIN: 0
CONSTIPATION: 0
COUGH: 0
VOMITING: 0
SHORTNESS OF BREATH: 0
TROUBLE SWALLOWING: 0
SINUS PAIN: 1
DIARRHEA: 0
SINUS PRESSURE: 1
WHEEZING: 0
VISUAL CHANGE: 0
RHINORRHEA: 0
SORE THROAT: 1

## 2024-01-18 NOTE — PROGRESS NOTES
Name: Nikhil Porter  : 1980         Chief Complaint:     Chief Complaint   Patient presents with    Mental Health Problem     Routine check.    Otalgia     Right ear X 1 week.       History of Present Illness:      Nikhil Porter is a 43 y.o.  male who presents with Mental Health Problem (Routine check.) and Otalgia (Right ear X 1 week.)      Nikhil is here today for a routine office visit.    Overall feeling well other than current ear pain and sinus issues.  He states meloxicam was not helpful for his arthralgias.  He states he was wondering if there is something he could take twice daily.  See below for further comments.    Mental Health Problem  The primary symptoms do not include dysphoric mood, delusions, hallucinations, bizarre behavior, disorganized speech, negative symptoms or somatic symptoms. Primary symptoms comment: NERVOUSNESS. The current episode started more than 1 month ago. This is a chronic problem.   The onset of the illness is precipitated by emotional stress. The degree of incapacity that he is experiencing as a consequence of his illness is moderate. Sequelae of the illness include harmed interpersonal relations and an inability to work. Additional symptoms of the illness include agitation (rare). Additional symptoms of the illness do not include anhedonia, insomnia, hypersomnia, appetite change, unexpected weight change, fatigue, psychomotor retardation, feelings of worthlessness, attention impairment, euphoric mood, increased goal-directed activity, flight of ideas, inflated self-esteem, decreased need for sleep, distractible, poor judgment, visual change, headaches, abdominal pain or seizures. He does not admit to suicidal ideas. He does not have a plan to attempt suicide. He does not contemplate harming himself. He has not already injured self. He does not contemplate injuring another person. He has not already  injured another person. Risk factors that are present for mental illness

## 2024-01-18 NOTE — PATIENT INSTRUCTIONS
SURVEY:     You may be receiving a survey from [facility] [employer] regarding your visit today.     Please complete the survey to enable us to provide the highest quality of care to you and your family.     If you cannot score us a very good on any question, please call the office to discuss how we could have made your experience a very good one.     Thank you,    Demar Jean, APRN-CNP  Zo Stock, APRN-CNP  Migdalia, MIKE Cartwright, HERBERT Talbot, CMA  Milena, CMA  Katt, PCA  Diana, PM

## 2024-03-25 ENCOUNTER — OFFICE VISIT (OUTPATIENT)
Dept: PRIMARY CARE CLINIC | Age: 44
End: 2024-03-25
Payer: COMMERCIAL

## 2024-03-25 VITALS
BODY MASS INDEX: 25.71 KG/M2 | WEIGHT: 189.6 LBS | TEMPERATURE: 97.5 F | OXYGEN SATURATION: 97 % | RESPIRATION RATE: 16 BRPM | DIASTOLIC BLOOD PRESSURE: 78 MMHG | HEART RATE: 100 BPM | SYSTOLIC BLOOD PRESSURE: 112 MMHG

## 2024-03-25 DIAGNOSIS — H66.91 RIGHT OTITIS MEDIA, UNSPECIFIED OTITIS MEDIA TYPE: Primary | ICD-10-CM

## 2024-03-25 PROCEDURE — 1036F TOBACCO NON-USER: CPT | Performed by: NURSE PRACTITIONER

## 2024-03-25 PROCEDURE — G8484 FLU IMMUNIZE NO ADMIN: HCPCS | Performed by: NURSE PRACTITIONER

## 2024-03-25 PROCEDURE — 99214 OFFICE O/P EST MOD 30 MIN: CPT | Performed by: NURSE PRACTITIONER

## 2024-03-25 PROCEDURE — G8427 DOCREV CUR MEDS BY ELIG CLIN: HCPCS | Performed by: NURSE PRACTITIONER

## 2024-03-25 PROCEDURE — G8419 CALC BMI OUT NRM PARAM NOF/U: HCPCS | Performed by: NURSE PRACTITIONER

## 2024-03-25 RX ORDER — AZITHROMYCIN 250 MG/1
TABLET, FILM COATED ORAL
Qty: 6 TABLET | Refills: 0 | Status: SHIPPED | OUTPATIENT
Start: 2024-03-25 | End: 2024-04-04

## 2024-03-25 ASSESSMENT — ENCOUNTER SYMPTOMS
RESPIRATORY NEGATIVE: 1
ALLERGIC/IMMUNOLOGIC NEGATIVE: 1
DIARRHEA: 1
EYES NEGATIVE: 1

## 2024-03-25 ASSESSMENT — PATIENT HEALTH QUESTIONNAIRE - PHQ9
1. LITTLE INTEREST OR PLEASURE IN DOING THINGS: NOT AT ALL
2. FEELING DOWN, DEPRESSED OR HOPELESS: NOT AT ALL
SUM OF ALL RESPONSES TO PHQ QUESTIONS 1-9: 0
SUM OF ALL RESPONSES TO PHQ9 QUESTIONS 1 & 2: 0
SUM OF ALL RESPONSES TO PHQ QUESTIONS 1-9: 0

## 2024-03-25 NOTE — PROGRESS NOTES
[facility]  FREDY HAUSER CNP  [facility]  [address] [address]  Dept: [phone]  Dept Fax: [phone]      Name: Nikhil Porter  : 1980         Chief Complaint:     Chief Complaint   Patient presents with    Otalgia     X 1 week. Patient c/o right ear pain.        History of Present Illness:      Nikhil Porter is a 43 y.o.  male who presents with Otalgia (X 1 week. Patient c/o right ear pain. )      HPI    Nikhil is here today for right ear pain for a week.  He denies any drainage from the ear.  He states it has been popping on him on and off. Denies cold symptoms.    He states he has had diarrhea on and off for a couple days.  He was on a different job recently and had a different schedule where he was eating late at night.  He denies vomiting.    Past Medical History:     No past medical history on file.   Reviewed all health maintenance requirements and ordered appropriate tests  There are no preventive care reminders to display for this patient.    Past Surgical History:     No past surgical history on file.     Medications:       Prior to Admission medications    Medication Sig Start Date End Date Taking? Authorizing Provider   azithromycin (ZITHROMAX) 250 MG tablet 500mg on day 1 followed by 250mg on days 2 - 5 3/25/24 4/4/24 Yes Fredy Hauser APRN - CNP   celecoxib (CELEBREX) 100 MG capsule Take 1 capsule by mouth 2 times daily 24  Yes Demar Jean APRN - CNP   escitalopram (LEXAPRO) 10 MG tablet TAKE 1 AND 1/2 TABLET BY MOUTH DAILY 24  Yes Demar Jean APRN - CNP        Allergies:       Penicillins    Social History:     Tobacco:    reports that he has quit smoking. He has never used smokeless tobacco.  Alcohol:      reports current alcohol use.  Drug Use:  reports no history of drug use.    Family History:     No family history on file.    Review of Systems:     Positive and Negative as described in HPI    Review of Systems

## 2024-03-25 NOTE — PATIENT INSTRUCTIONS
SURVEY:     You may be receiving a survey from [facility] [employer] regarding your visit today.     Please complete the survey to enable us to provide the highest quality of care to you and your family.     If you cannot score us a very good on any question, please call the office to discuss how we could have made your experience a very good one.     Thank you,    Demar Jean, APRN-CNP  Zo Stock, APRN-CNP  Migdalia, LPN  Gabbie, CMA  Antione, CMA  Milena, CMA  Katt, PCA  Sue, CMA  Diana, PM

## 2024-06-06 DIAGNOSIS — M25.542 ARTHRALGIA OF BOTH HANDS: ICD-10-CM

## 2024-06-06 DIAGNOSIS — M25.541 ARTHRALGIA OF BOTH HANDS: ICD-10-CM

## 2024-06-06 RX ORDER — CELECOXIB 100 MG/1
100 CAPSULE ORAL 2 TIMES DAILY
Qty: 180 CAPSULE | Refills: 1 | Status: SHIPPED | OUTPATIENT
Start: 2024-06-06

## 2024-06-06 NOTE — TELEPHONE ENCOUNTER
Health Maintenance   Topic Date Due    Hepatitis B vaccine (1 of 3 - 3-dose series) 01/18/2025 (Originally 1980)    DTaP/Tdap/Td vaccine (1 - Tdap) 01/18/2025 (Originally 11/1/1998)    Flu vaccine (Season Ended) 01/18/2025 (Originally 8/1/2024)    COVID-19 Vaccine (3 - 2023-24 season) 01/18/2025 (Originally 9/1/2023)    Hepatitis C screen  01/18/2025 (Originally 11/1/1997)    HIV screen  01/18/2025 (Originally 11/1/1994)    Depression Screen  03/25/2025    Lipids  09/13/2027    Hepatitis A vaccine  Aged Out    Hib vaccine  Aged Out    HPV vaccine  Aged Out    Polio vaccine  Aged Out    Meningococcal (ACWY) vaccine  Aged Out    Pneumococcal 0-64 years Vaccine  Aged Out    A1C test (Diabetic or Prediabetic)  Discontinued    Diabetes screen  Discontinued             (applicable per patient's age: Cancer Screenings, Depression Screening, Fall Risk Screening, Immunizations)    Hemoglobin A1C (%)   Date Value   09/15/2022 5.5   08/17/2021 5.8     AST (U/L)   Date Value   09/13/2022 19     ALT (U/L)   Date Value   09/13/2022 20     BUN (mg/dL)   Date Value   09/13/2022 13      (goal A1C is < 7)   (goal LDL is <100) need 30-50% reduction from baseline     BP Readings from Last 3 Encounters:   03/25/24 112/78   01/18/24 122/74   07/18/23 124/78    (goal /80)      All Future Testing planned in [facility]:  Lab Frequency Next Occurrence   Rheumatoid Factor Once 07/18/2023   GARETT Screen With Reflex Once 07/18/2023   Sedimentation Rate Once 07/18/2023   C-Reactive Protein Once 07/18/2023       Next Visit Date:  Future Appointments   Date Time Provider Department Center   7/19/2024 10:40 AM Demar Jean, APRN - CNP [facility]            Patient Active Problem List:     Stress     Elevated blood sugar

## 2024-07-19 ENCOUNTER — OFFICE VISIT (OUTPATIENT)
Dept: PRIMARY CARE CLINIC | Age: 44
End: 2024-07-19
Payer: COMMERCIAL

## 2024-07-19 VITALS
TEMPERATURE: 97.1 F | RESPIRATION RATE: 16 BRPM | WEIGHT: 185 LBS | BODY MASS INDEX: 25.06 KG/M2 | OXYGEN SATURATION: 99 % | HEART RATE: 100 BPM | DIASTOLIC BLOOD PRESSURE: 84 MMHG | SYSTOLIC BLOOD PRESSURE: 132 MMHG | HEIGHT: 72 IN

## 2024-07-19 DIAGNOSIS — R06.81 WITNESSED EPISODE OF APNEA: ICD-10-CM

## 2024-07-19 DIAGNOSIS — G47.19 EXCESSIVE DAYTIME SLEEPINESS: ICD-10-CM

## 2024-07-19 DIAGNOSIS — R06.83 LOUD SNORING: ICD-10-CM

## 2024-07-19 DIAGNOSIS — F39 MOOD DISORDER (HCC): ICD-10-CM

## 2024-07-19 DIAGNOSIS — F43.9 STRESS: Primary | ICD-10-CM

## 2024-07-19 PROCEDURE — 1036F TOBACCO NON-USER: CPT | Performed by: NURSE PRACTITIONER

## 2024-07-19 PROCEDURE — G8419 CALC BMI OUT NRM PARAM NOF/U: HCPCS | Performed by: NURSE PRACTITIONER

## 2024-07-19 PROCEDURE — 99214 OFFICE O/P EST MOD 30 MIN: CPT | Performed by: NURSE PRACTITIONER

## 2024-07-19 PROCEDURE — G8427 DOCREV CUR MEDS BY ELIG CLIN: HCPCS | Performed by: NURSE PRACTITIONER

## 2024-07-19 RX ORDER — BUPROPION HYDROCHLORIDE 150 MG/1
150 TABLET ORAL EVERY MORNING
Qty: 90 TABLET | Refills: 0 | Status: SHIPPED | OUTPATIENT
Start: 2024-07-19

## 2024-07-19 NOTE — PATIENT INSTRUCTIONS
SURVEY:     You may be receiving a survey from [facility] [employer] regarding your visit today.     Please complete the survey to enable us to provide the highest quality of care to you and your family.     If you cannot score us a very good on any question, please call the office to discuss how we could have made your experience a very good one.     Thank you,    Demar Jean, APRN-CNP  Zo Stock, APRN-CNP  Migdalia, LPN  Gabbie, CMA  Antione, CMA  Milena, CMA  Katt, PCA  Sue, CMA  Diana, PM

## 2024-07-19 NOTE — PROGRESS NOTES
Name: Nikhil Porter  : 1980         Chief Complaint:     Chief Complaint   Patient presents with    Anxiety     6 month check. Patient doing well on Lexapro. \"Having good days and bad days.\"     Sleep Problem     Patient c/o sleeping issues of waking up tired and would like to get a sleep study done.        History of Present Illness:      Nikhil Porter is a 43 y.o.  male who presents with Anxiety (6 month check. Patient doing well on Lexapro. \"Having good days and bad days.\" ) and Sleep Problem (Patient c/o sleeping issues of waking up tired and would like to get a sleep study done. )      Nikhil is here today for a routine office visit.    Unfortunately has been having some increased stress.  He states he has been drinking more alcohol due to the stress and this is upsetting to his girlfriend and his girlfriend's mother.  He also states he knows he has more stressed than usual.  He states he is having lack of motivation and just \"gets in a rut\".  See below for further comments.    Excessive daytime sleepiness/loud snoring/witnessed apnea-patient states his significant other is told him that his snoring is so loud that he needs to sleep in another room.  Also he is having episodes in the night gasping for air as witnessed by his significant other.  He is very tired during the day but he does not know if this is related to stress or sleeping.    Mental Health Problem  The primary symptoms include dysphoric mood and negative symptoms. The primary symptoms do not include delusions, hallucinations, bizarre behavior, disorganized speech or somatic symptoms. Primary symptoms comment: NERVOUSNESS. The current episode started more than 1 month ago. This is a chronic problem.   The dysphoric mood began more than 2 weeks ago. The mood has been worsening since its onset. He characterizes the problem as moderate. The mood includes feelings of irritability. His change in mood was precipitated by a stressful event.   The

## 2024-07-22 ASSESSMENT — ENCOUNTER SYMPTOMS
WHEEZING: 0
VISUAL CHANGE: 0
SORE THROAT: 0
SHORTNESS OF BREATH: 0
COUGH: 0
DIARRHEA: 0
ABDOMINAL PAIN: 0
RHINORRHEA: 0
CONSTIPATION: 0
NAUSEA: 0
VOMITING: 0

## 2024-07-29 DIAGNOSIS — R06.81 WITNESSED EPISODE OF APNEA: ICD-10-CM

## 2024-07-29 DIAGNOSIS — G47.19 EXCESSIVE DAYTIME SLEEPINESS: Primary | ICD-10-CM

## 2024-07-29 DIAGNOSIS — R06.83 LOUD SNORING: ICD-10-CM

## 2024-08-05 ENCOUNTER — HOSPITAL ENCOUNTER (OUTPATIENT)
Dept: SLEEP CENTER | Age: 44
Discharge: HOME | End: 2024-08-05
Payer: COMMERCIAL

## 2024-08-05 VITALS — BODY MASS INDEX: 25.06 KG/M2 | HEIGHT: 72 IN | WEIGHT: 185 LBS

## 2024-08-05 DIAGNOSIS — R06.81 WITNESSED EPISODE OF APNEA: ICD-10-CM

## 2024-08-05 DIAGNOSIS — R06.83 LOUD SNORING: ICD-10-CM

## 2024-08-05 DIAGNOSIS — G47.19 EXCESSIVE DAYTIME SLEEPINESS: ICD-10-CM

## 2024-08-05 PROCEDURE — 95806 SLEEP STUDY UNATT&RESP EFFT: CPT

## 2024-08-05 ASSESSMENT — SLEEP AND FATIGUE QUESTIONNAIRES
HOW LIKELY ARE YOU TO NOD OFF OR FALL ASLEEP IN A CAR, WHILE STOPPED FOR A FEW MINUTES IN TRAFFIC: WOULD NEVER DOZE
HAVE YOU EVER NODDED OFF OR FALLEN ASLEEP WHILE DRIVING: YES
ESS TOTAL SCORE: 12
HAS ANYONE NOTICED THAT YOU QUIT BREATHING DURING SLEEP: ALMOST DAILY
DO YOU HAVE HIGH BLOOD PRESSURE: NO
WHAT TIME DO YOU USUALLY WAKE UP: 14400
NUMBER OF TIMES YOU WAKE PER NIGHT: 1
WHAT TIME DO YOU USUALLY GO TO BED: 72000
NORMAL AMOUNT OF SLEEP PER NIGHT: 6
HOW LIKELY ARE YOU TO NOD OFF OR FALL ASLEEP WHILE SITTING QUIETLY AFTER LUNCH WITHOUT ALCOHOL: HIGH CHANCE OF DOZING
HOW LIKELY ARE YOU TO NOD OFF OR FALL ASLEEP WHILE SITTING AND READING: HIGH CHANCE OF DOZING
FOR THE FIRST 30 MINUTES AFTER WAKING, I AM: SOMEWHAT DROWSY
DOES YOUR SNORING BOTHER OTHERS: YES
HOW LIKELY ARE YOU TO NOD OFF OR FALL ASLEEP WHILE WATCHING TV: MODERATE CHANCE OF DOZING
USUAL AMOUNT OF TIME TO FALL ASLEEP (MIN): 60
FUNCTION BEST IN: EVENING
HOW LIKELY ARE YOU TO NOD OFF OR FALL ASLEEP WHEN YOU ARE A PASSENGER IN A CAR FOR AN HOUR WITHOUT A BREAK: WOULD NEVER DOZE
HOW MANY NAPS DO YOU TAKE PER WEEK: 5
HOW LIKELY ARE YOU TO NOD OFF OR FALL ASLEEP WHILE LYING DOWN TO REST IN THE AFTERNOON WHEN CIRCUMSTANCES PERMIT: HIGH CHANCE OF DOZING
HOW OFTEN DO YOU SNORE: ALMOST DAILY
ARE YOU TIRED DURING WAKE TIME: ALMOST DAILY
DO YOU SNORE: YES
ARE YOU TIRED AFTER SLEEPING: ALMOST DAILY
HOW OFTEN HAVE YOU NODDED OFF OR FALLEN ASLEEP WHILE DRIVING: 1-2 TIMES A MONTH
HOW LIKELY ARE YOU TO NOD OFF OR FALL ASLEEP WHILE SITTING AND TALKING TO SOMEONE: WOULD NEVER DOZE
SNORING VOLUME: AS LOUD AS TALKING
HOW LIKELY ARE YOU TO NOD OFF OR FALL ASLEEP WHILE SITTING INACTIVE IN A PUBLIC PLACE: SLIGHT CHANCE OF DOZING
I SLEEP WELL: NO

## 2024-08-05 NOTE — PROGRESS NOTES
Patient arrived for home sleep testing 8/5/24 at 6:50pm. Testing explained, all questions answered, pt voices understanding. Patient will return unit #[id number] to ER in the morning on his way to work

## 2024-08-08 ENCOUNTER — PATIENT MESSAGE (OUTPATIENT)
Dept: PRIMARY CARE CLINIC | Age: 44
End: 2024-08-08

## 2024-08-09 ENCOUNTER — OFFICE VISIT (OUTPATIENT)
Dept: PRIMARY CARE CLINIC | Age: 44
End: 2024-08-09
Payer: COMMERCIAL

## 2024-08-09 VITALS
SYSTOLIC BLOOD PRESSURE: 132 MMHG | DIASTOLIC BLOOD PRESSURE: 76 MMHG | HEART RATE: 121 BPM | OXYGEN SATURATION: 95 % | TEMPERATURE: 99.2 F | BODY MASS INDEX: 25.84 KG/M2 | WEIGHT: 190.5 LBS

## 2024-08-09 DIAGNOSIS — R00.0 TACHYCARDIA: ICD-10-CM

## 2024-08-09 DIAGNOSIS — Z00.00 WELLNESS EXAMINATION: Primary | ICD-10-CM

## 2024-08-09 DIAGNOSIS — F43.9 STRESS: ICD-10-CM

## 2024-08-09 PROCEDURE — 99396 PREV VISIT EST AGE 40-64: CPT | Performed by: NURSE PRACTITIONER

## 2024-08-09 RX ORDER — BUPROPION HYDROCHLORIDE 300 MG/1
300 TABLET ORAL EVERY MORNING
Qty: 90 TABLET | Refills: 1 | Status: SHIPPED | OUTPATIENT
Start: 2024-08-09

## 2024-08-09 RX ORDER — METOPROLOL SUCCINATE 25 MG/1
25 TABLET, EXTENDED RELEASE ORAL
Qty: 90 TABLET | Refills: 1 | Status: SHIPPED | OUTPATIENT
Start: 2024-08-09

## 2024-08-09 ASSESSMENT — ENCOUNTER SYMPTOMS
NAUSEA: 0
ABDOMINAL PAIN: 0
RHINORRHEA: 0
CONSTIPATION: 0
VISUAL CHANGE: 0
SHORTNESS OF BREATH: 0
COUGH: 0
SORE THROAT: 0
VOMITING: 0
WHEEZING: 0
DIARRHEA: 0

## 2024-08-09 NOTE — PATIENT INSTRUCTIONS
SURVEY:     You may be receiving a survey from [facility] [employer] [employer] regarding your visit today.     Please complete the survey to enable us to provide the highest quality of care to you and your family.     If you cannot score us a very good on any question, please call the office to discuss how we could have made your experience a very good one.     Thank you,    Demar Jean, APRN-CNP  Zo Stock, APRN-CNP  Migdalia, LPN  Gabbie, CMA  Antione, CMA  Milena, CMA  Katt, PCA  Sue, CMA  Diana, PM

## 2024-08-09 NOTE — PROGRESS NOTES
Exam:   Vitals:  /76 (Site: Left Upper Arm, Position: Sitting, Cuff Size: Medium Adult)   Pulse (!) 121   Temp 99.2 °F (37.3 °C)   Wt 86.4 kg (190 lb 8 oz)   SpO2 95%   BMI 25.84 kg/m²     Physical Exam  Vitals and nursing note reviewed.   Constitutional:       General: He is not in acute distress.     Appearance: Normal appearance. He is not ill-appearing.   HENT:      Mouth/Throat:      Mouth: Mucous membranes are moist.   Eyes:      General: No scleral icterus.     Conjunctiva/sclera: Conjunctivae normal.   Neck:      Vascular: No carotid bruit.   Cardiovascular:      Rate and Rhythm: Regular rhythm. Tachycardia present.      Heart sounds: No murmur heard.  Pulmonary:      Effort: Pulmonary effort is normal.      Breath sounds: Normal breath sounds. No wheezing.   Abdominal:      General: Bowel sounds are normal. There is no distension.      Palpations: Abdomen is soft.      Tenderness: There is no abdominal tenderness.   Musculoskeletal:         General: Normal range of motion.      Cervical back: Normal range of motion and neck supple.      Right lower leg: No edema.      Left lower leg: No edema.   Skin:     General: Skin is warm and dry.      Findings: No rash.   Neurological:      Mental Status: He is alert and oriented to person, place, and time.   Psychiatric:         Attention and Perception: Attention normal.         Mood and Affect: Affect normal. Mood is anxious (MILD). Mood is not depressed.         Speech: Speech normal.         Behavior: Behavior normal. Behavior is cooperative.         Thought Content: Thought content normal. Thought content does not include homicidal or suicidal ideation. Thought content does not include homicidal or suicidal plan.         Cognition and Memory: Cognition normal.         Judgment: Judgment normal.         Data:     Lab Results   Component Value Date/Time     09/13/2022 08:09 AM    K 4.5 09/13/2022 08:09 AM     09/13/2022 08:09 AM    CO2 29

## 2024-08-09 NOTE — TELEPHONE ENCOUNTER
From: Nikhil Porter  To: Demar Jean  Sent: 8/8/2024 9:27 PM EDT  Subject: Co pay    Is there a way to code my visit as a health checkup? I think my insurance will pay for it & i dont have to pay the 30$ office visit?

## 2024-08-28 ENCOUNTER — TELEPHONE (OUTPATIENT)
Dept: PRIMARY CARE CLINIC | Age: 44
End: 2024-08-28

## 2024-08-28 LAB — STATUS: NORMAL

## 2024-08-28 NOTE — TELEPHONE ENCOUNTER
----- Message from MARQUITA Macedo CNP sent at 8/28/2024 11:30 AM EDT -----  Please notify patient of sleep study results.  He has severe obstructive sleep apnea.  Ask him if he needs a titration study ordered or if it has been done? Thanks Zo

## 2024-09-06 ENCOUNTER — OFFICE VISIT (OUTPATIENT)
Dept: PRIMARY CARE CLINIC | Age: 44
End: 2024-09-06
Payer: COMMERCIAL

## 2024-09-06 VITALS
WEIGHT: 186.1 LBS | HEART RATE: 87 BPM | TEMPERATURE: 97.3 F | OXYGEN SATURATION: 97 % | DIASTOLIC BLOOD PRESSURE: 74 MMHG | BODY MASS INDEX: 25.24 KG/M2 | SYSTOLIC BLOOD PRESSURE: 130 MMHG

## 2024-09-06 DIAGNOSIS — R00.0 TACHYCARDIA: ICD-10-CM

## 2024-09-06 DIAGNOSIS — F43.9 STRESS: Primary | ICD-10-CM

## 2024-09-06 PROCEDURE — G8427 DOCREV CUR MEDS BY ELIG CLIN: HCPCS | Performed by: NURSE PRACTITIONER

## 2024-09-06 PROCEDURE — G8419 CALC BMI OUT NRM PARAM NOF/U: HCPCS | Performed by: NURSE PRACTITIONER

## 2024-09-06 PROCEDURE — 1036F TOBACCO NON-USER: CPT | Performed by: NURSE PRACTITIONER

## 2024-09-06 PROCEDURE — 99214 OFFICE O/P EST MOD 30 MIN: CPT | Performed by: NURSE PRACTITIONER

## 2024-09-06 ASSESSMENT — ENCOUNTER SYMPTOMS
VOMITING: 0
COUGH: 0
ABDOMINAL PAIN: 0
CONSTIPATION: 0
VISUAL CHANGE: 0
WHEEZING: 0
SHORTNESS OF BREATH: 0
DIARRHEA: 0
SORE THROAT: 0
NAUSEA: 0
RHINORRHEA: 0

## 2024-09-06 NOTE — PROGRESS NOTES
celecoxib (CELEBREX) 100 MG capsule Take 1 capsule by mouth 2 times daily 6/6/24  Yes Might, MARQUITA Wynn CNP   escitalopram (LEXAPRO) 10 MG tablet TAKE 1 AND 1/2 TABLET BY MOUTH DAILY 1/17/24  Yes Demar Jean APRN - CNP        Allergies:       Penicillins    Social History:     Tobacco:    reports that he has quit smoking. He has never used smokeless tobacco.  Alcohol:      reports current alcohol use.  Drug Use:  reports no history of drug use.    Family History:     History reviewed. No pertinent family history.    Review of Systems:     Positive and Negative as described in HPI    Review of Systems   Constitutional:  Negative for appetite change, chills, fatigue, fever and unexpected weight change.   HENT:  Negative for congestion, rhinorrhea and sore throat.    Eyes:  Negative for visual disturbance.   Respiratory:  Negative for cough, shortness of breath and wheezing.    Cardiovascular:  Negative for chest pain and palpitations.   Gastrointestinal:  Negative for abdominal pain, constipation, diarrhea, nausea and vomiting.   Genitourinary:  Negative for difficulty urinating and dysuria.   Musculoskeletal:  Negative for gait problem, neck pain and neck stiffness.   Skin:  Negative for rash.   Neurological:  Negative for dizziness, seizures, syncope, light-headedness, numbness and headaches.   Psychiatric/Behavioral:  Negative for agitation, behavioral problems, confusion, decreased concentration, dysphoric mood, hallucinations, self-injury, sleep disturbance and suicidal ideas. The patient is not nervous/anxious, does not have insomnia and is not hyperactive.        Physical Exam:   Vitals:  /74 (Site: Left Upper Arm, Position: Sitting, Cuff Size: Medium Adult)   Pulse 87   Temp 97.3 °F (36.3 °C)   Wt 84.4 kg (186 lb 1.6 oz)   SpO2 97%   BMI 25.24 kg/m²     Physical Exam  Vitals and nursing note reviewed.   Constitutional:       General: He is not in acute distress.     Appearance: Normal

## 2024-09-06 NOTE — PATIENT INSTRUCTIONS
SURVEY:     You may be receiving a survey from [facility] regarding your visit today.     Please complete the survey to enable us to provide the highest quality of care to you and your family.     If you cannot score us a very good on any question, please call the office to discuss how we could have made your experience a very good one.     Thank you,    Demar Jean, APRN-CNP  Zo Stock, APRN-CNP  Migdalia, LPN  Gabbie, CMA  Antione, CMA  Milena, CMA  Katt, PCA  Sue, CMA  Diana, PM

## 2025-01-09 DIAGNOSIS — F43.9 STRESS: ICD-10-CM

## 2025-01-09 RX ORDER — ESCITALOPRAM OXALATE 10 MG/1
TABLET ORAL
Qty: 135 TABLET | Refills: 3 | Status: SHIPPED | OUTPATIENT
Start: 2025-01-09

## 2025-03-07 ENCOUNTER — OFFICE VISIT (OUTPATIENT)
Dept: PRIMARY CARE CLINIC | Age: 45
End: 2025-03-07
Payer: COMMERCIAL

## 2025-03-07 VITALS
WEIGHT: 199.5 LBS | BODY MASS INDEX: 27.06 KG/M2 | HEART RATE: 84 BPM | TEMPERATURE: 97.9 F | SYSTOLIC BLOOD PRESSURE: 128 MMHG | OXYGEN SATURATION: 97 % | DIASTOLIC BLOOD PRESSURE: 76 MMHG

## 2025-03-07 DIAGNOSIS — Z00.00 WELLNESS EXAMINATION: Primary | ICD-10-CM

## 2025-03-07 DIAGNOSIS — Z12.11 COLON CANCER SCREENING: ICD-10-CM

## 2025-03-07 DIAGNOSIS — N52.9 ERECTILE DYSFUNCTION, UNSPECIFIED ERECTILE DYSFUNCTION TYPE: ICD-10-CM

## 2025-03-07 DIAGNOSIS — F43.9 STRESS: ICD-10-CM

## 2025-03-07 PROCEDURE — 99396 PREV VISIT EST AGE 40-64: CPT | Performed by: NURSE PRACTITIONER

## 2025-03-07 RX ORDER — BUPROPION HYDROCHLORIDE 300 MG/1
300 TABLET ORAL EVERY MORNING
Qty: 90 TABLET | Refills: 3 | Status: SHIPPED | OUTPATIENT
Start: 2025-03-07

## 2025-03-07 RX ORDER — CELECOXIB 100 MG/1
100 CAPSULE ORAL 2 TIMES DAILY
Qty: 180 CAPSULE | Refills: 1 | Status: CANCELLED | OUTPATIENT
Start: 2025-03-07

## 2025-03-07 RX ORDER — CELECOXIB 200 MG/1
200 CAPSULE ORAL 2 TIMES DAILY
Qty: 180 CAPSULE | Refills: 1 | Status: SHIPPED | OUTPATIENT
Start: 2025-03-07

## 2025-03-07 RX ORDER — SILDENAFIL 50 MG/1
50 TABLET, FILM COATED ORAL PRN
Qty: 30 TABLET | Refills: 3 | Status: SHIPPED | OUTPATIENT
Start: 2025-03-07

## 2025-03-07 SDOH — ECONOMIC STABILITY: FOOD INSECURITY: WITHIN THE PAST 12 MONTHS, THE FOOD YOU BOUGHT JUST DIDN'T LAST AND YOU DIDN'T HAVE MONEY TO GET MORE.: NEVER TRUE

## 2025-03-07 SDOH — ECONOMIC STABILITY: FOOD INSECURITY: WITHIN THE PAST 12 MONTHS, YOU WORRIED THAT YOUR FOOD WOULD RUN OUT BEFORE YOU GOT MONEY TO BUY MORE.: NEVER TRUE

## 2025-03-07 ASSESSMENT — ENCOUNTER SYMPTOMS
SORE THROAT: 0
WHEEZING: 0
DIARRHEA: 0
VOMITING: 0
NAUSEA: 0
SHORTNESS OF BREATH: 0
COUGH: 0
RHINORRHEA: 0
VISUAL CHANGE: 0
CONSTIPATION: 0
ABDOMINAL PAIN: 0

## 2025-03-07 ASSESSMENT — PATIENT HEALTH QUESTIONNAIRE - PHQ9
1. LITTLE INTEREST OR PLEASURE IN DOING THINGS: NOT AT ALL
SUM OF ALL RESPONSES TO PHQ QUESTIONS 1-9: 0
2. FEELING DOWN, DEPRESSED OR HOPELESS: NOT AT ALL
SUM OF ALL RESPONSES TO PHQ QUESTIONS 1-9: 0

## 2025-03-07 NOTE — PROGRESS NOTES
Name: Nikhil Porter  : 1980         Chief Complaint:     Chief Complaint   Patient presents with    Wellness Program    Erectile Dysfunction    Mental Health Problem       History of Present Illness:      Nikhil Porter is a 44 y.o.  male who presents with Wellness Program, Erectile Dysfunction, and Mental Health Problem      Nikhil is here today for a wellness examination.    Overall he states he is feeling very well.  Unfortunately his father did pass away this week and he is feeling somewhat saddened but also relieved as his father has been in the nursing home for quite some time.  He states his medications are working well.  He is compliant with diet and exercise.  He has recently been exercising routinely at [employer].  He states he is eating well and avoiding alcohol.  He is having a few issues with the ED.  See below for further comment.      Erectile Dysfunction  This is a new problem. The current episode started more than 1 month ago. The problem has been waxing and waning since onset. The nature of his difficulty is maintaining erection and achieving erection. Non-physiologic factors contributing to erectile dysfunction are anxiety (controlled). He reports no decreased libido or performance anxiety. He reports his erection duration to be 1 to 5 minutes. Irritative symptoms do not include frequency, nocturia or urgency. Obstructive symptoms do not include dribbling, incomplete emptying, an intermittent stream, a slower stream, straining or a weak stream. Pertinent negatives include no chills, dysuria, genital pain, hematuria, hesitancy or inability to urinate. The symptoms are aggravated by stress. Past treatments include nothing. The treatment provided no relief. He has had no adverse reactions caused by medications. Risk factors include tobacco use.   Mental Health Problem  The primary symptoms do not include dysphoric mood, delusions, hallucinations, bizarre behavior, disorganized speech, negative

## 2025-03-07 NOTE — PATIENT INSTRUCTIONS
SURVEY:     You may be receiving a survey from [facility] [employer] regarding your visit today.     Please complete the survey to enable us to provide the highest quality of care to you and your family.     If you cannot score us a very good on any question, please call the office to discuss how we could have made your experience a very good one.     Thank you,    Demar Jean, APRN-CNP  Zo Stock, APRN-CNP  Migdalia, LPN  Gabbie, CMA  Antione, CMA  Milena, CMA  Katt, PCA  Sue, CMA  Diana, PM

## 2025-03-22 ENCOUNTER — RESULTS FOLLOW-UP (OUTPATIENT)
Dept: PRIMARY CARE CLINIC | Age: 45
End: 2025-03-22

## 2025-03-22 LAB
ALT SERPL-CCNC: 26 U/L (ref 5–41)
ANION GAP SERPL CALCULATED.3IONS-SCNC: 11 MMOL/L (ref 7–16)
AST SERPL-CCNC: 19 U/L (ref 9–50)
BASOPHILS ABSOLUTE: 0.07 K/UL (ref 0–0.2)
BASOPHILS RELATIVE PERCENT: 1 % (ref 0–2)
BUN BLDV-MCNC: 15 MG/DL (ref 6–20)
CALCIUM SERPL-MCNC: 9.9 MG/DL (ref 8.6–10.5)
CHLORIDE BLD-SCNC: 101 MMOL/L (ref 96–107)
CHOLESTEROL, TOTAL: 215 MG/DL (ref 100–199)
CHOLESTEROL/HDL RATIO: 3.6 (ref 2–4.5)
CO2: 29 MMOL/L (ref 18–32)
CREAT SERPL-MCNC: 0.91 MG/DL (ref 0.67–1.3)
EGFR IF NONAFRICAN AMERICAN: 106 ML/MIN/1.73M2
EOSINOPHILS ABSOLUTE: 0.22 K/UL (ref 0–0.8)
EOSINOPHILS RELATIVE PERCENT: 3.2 % (ref 0–5)
GLUCOSE: 102 MG/DL (ref 70–100)
HCT VFR BLD CALC: 42.5 % (ref 39–52)
HDLC SERPL-MCNC: 59 MG/DL
HEMOGLOBIN: 14.1 G/DL (ref 13–18)
IMMATURE GRANS (ABS): 0.11 K/UL (ref 0–0.06)
IMMATURE GRANULOCYTES %: 1.6 % (ref 0–2)
LDL CHOLESTEROL: 130 MG/DL
LDL/HDL RATIO: 2.2
LYMPHOCYTES ABSOLUTE: 2.09 K/UL (ref 0.9–5.2)
LYMPHOCYTES RELATIVE PERCENT: 30.2 % (ref 20–45)
MCH RBC QN AUTO: 28.8 PG (ref 26–32)
MCHC RBC AUTO-ENTMCNC: 33.2 G/DL (ref 32–35)
MCV RBC AUTO: 87 FL (ref 75–100)
MONOCYTES ABSOLUTE: 0.72 K/UL (ref 0.1–1)
MONOCYTES RELATIVE PERCENT: 10.4 % (ref 0–13)
NEUTROPHILS ABSOLUTE: 3.7 K/UL (ref 1.9–8)
NEUTROPHILS RELATIVE PERCENT: 53.6 % (ref 45–75)
PDW BLD-RTO: 12.7 % (ref 11.2–14.8)
PLATELET # BLD: 189 THOUS/CMM (ref 140–440)
POTASSIUM SERPL-SCNC: 4.3 MMOL/L (ref 3.5–5.4)
RBC # BLD: 4.89 MILL/CMM (ref 4.4–6.1)
SODIUM BLD-SCNC: 141 MMOL/L (ref 135–148)
TRIGL SERPL-MCNC: 131 MG/DL (ref 20–149)
VLDLC SERPL CALC-MCNC: 26 MG/DL
WBC # BLD: 6.9 THDS/CMM (ref 3.6–11)

## 2025-03-24 NOTE — TELEPHONE ENCOUNTER
----- Message from MARQUITA Corrigan CNP sent at 3/22/2025  9:28 AM EDT -----  Results are normal, please call patient and make them aware.

## 2025-08-28 ENCOUNTER — HOSPITAL ENCOUNTER (OUTPATIENT)
Dept: GENERAL RADIOLOGY | Age: 45
Discharge: HOME | End: 2025-08-30
Payer: COMMERCIAL

## 2025-08-28 DIAGNOSIS — M25.561 ACUTE PAIN OF BOTH KNEES: ICD-10-CM

## 2025-08-28 DIAGNOSIS — M25.562 ACUTE PAIN OF BOTH KNEES: ICD-10-CM

## 2025-08-28 PROCEDURE — 73562 X-RAY EXAM OF KNEE 3: CPT

## 2025-08-30 ENCOUNTER — RESULTS FOLLOW-UP (OUTPATIENT)
Dept: PRIMARY CARE CLINIC | Age: 45
End: 2025-08-30

## 2025-09-05 ENCOUNTER — OFFICE VISIT (OUTPATIENT)
Dept: PRIMARY CARE CLINIC | Age: 45
End: 2025-09-05
Payer: COMMERCIAL

## 2025-09-05 VITALS
DIASTOLIC BLOOD PRESSURE: 72 MMHG | TEMPERATURE: 97 F | SYSTOLIC BLOOD PRESSURE: 122 MMHG | HEART RATE: 90 BPM | BODY MASS INDEX: 26.83 KG/M2 | WEIGHT: 198.1 LBS | OXYGEN SATURATION: 98 % | HEIGHT: 72 IN | RESPIRATION RATE: 16 BRPM

## 2025-09-05 DIAGNOSIS — M25.561 ACUTE PAIN OF BOTH KNEES: ICD-10-CM

## 2025-09-05 DIAGNOSIS — R73.9 ELEVATED BLOOD SUGAR: ICD-10-CM

## 2025-09-05 DIAGNOSIS — Z12.5 SCREENING PSA (PROSTATE SPECIFIC ANTIGEN): ICD-10-CM

## 2025-09-05 DIAGNOSIS — M25.562 ACUTE PAIN OF BOTH KNEES: ICD-10-CM

## 2025-09-05 DIAGNOSIS — F43.9 STRESS: Primary | ICD-10-CM

## 2025-09-05 PROCEDURE — 1036F TOBACCO NON-USER: CPT | Performed by: NURSE PRACTITIONER

## 2025-09-05 PROCEDURE — G8427 DOCREV CUR MEDS BY ELIG CLIN: HCPCS | Performed by: NURSE PRACTITIONER

## 2025-09-05 PROCEDURE — 99214 OFFICE O/P EST MOD 30 MIN: CPT | Performed by: NURSE PRACTITIONER

## 2025-09-05 PROCEDURE — G8419 CALC BMI OUT NRM PARAM NOF/U: HCPCS | Performed by: NURSE PRACTITIONER

## 2025-09-05 ASSESSMENT — PATIENT HEALTH QUESTIONNAIRE - PHQ9
SUM OF ALL RESPONSES TO PHQ QUESTIONS 1-9: 0
1. LITTLE INTEREST OR PLEASURE IN DOING THINGS: NOT AT ALL
SUM OF ALL RESPONSES TO PHQ QUESTIONS 1-9: 0
2. FEELING DOWN, DEPRESSED OR HOPELESS: NOT AT ALL
SUM OF ALL RESPONSES TO PHQ QUESTIONS 1-9: 0
SUM OF ALL RESPONSES TO PHQ QUESTIONS 1-9: 0